# Patient Record
Sex: FEMALE | Race: WHITE | NOT HISPANIC OR LATINO | Employment: PART TIME | URBAN - METROPOLITAN AREA
[De-identification: names, ages, dates, MRNs, and addresses within clinical notes are randomized per-mention and may not be internally consistent; named-entity substitution may affect disease eponyms.]

---
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
## 2023-01-03 VITALS
TEMPERATURE: 98 F | HEART RATE: 101 BPM | WEIGHT: 224 LBS | RESPIRATION RATE: 19 BRPM | SYSTOLIC BLOOD PRESSURE: 152 MMHG | DIASTOLIC BLOOD PRESSURE: 88 MMHG | OXYGEN SATURATION: 99 % | BODY MASS INDEX: 37.27 KG/M2

## 2023-01-03 PROCEDURE — 99283 EMERGENCY DEPT VISIT LOW MDM: CPT | Mod: 25

## 2023-01-03 PROCEDURE — 93010 EKG 12-LEAD: ICD-10-PCS | Mod: ,,, | Performed by: INTERNAL MEDICINE

## 2023-01-03 PROCEDURE — 93010 ELECTROCARDIOGRAM REPORT: CPT | Mod: ,,, | Performed by: INTERNAL MEDICINE

## 2023-01-03 PROCEDURE — 93005 ELECTROCARDIOGRAM TRACING: CPT

## 2023-01-03 RX ORDER — ONDANSETRON 2 MG/ML
4 INJECTION INTRAMUSCULAR; INTRAVENOUS
Status: DISCONTINUED | OUTPATIENT
Start: 2023-01-03 | End: 2023-01-04 | Stop reason: HOSPADM

## 2023-01-04 ENCOUNTER — HOSPITAL ENCOUNTER (EMERGENCY)
Facility: HOSPITAL | Age: 18
Discharge: HOME | End: 2023-01-04
Payer: MEDICAID

## 2023-01-04 DIAGNOSIS — R00.2 PALPITATIONS: ICD-10-CM

## 2023-01-04 LAB
ALBUMIN SERPL BCP-MCNC: 4.4 G/DL (ref 3.5–5.2)
ALP SERPL-CCNC: 123 U/L (ref 55–135)
ALT SERPL W/O P-5'-P-CCNC: 16 U/L (ref 10–44)
AMPHET+METHAMPHET UR QL: NEGATIVE
ANION GAP SERPL CALC-SCNC: 11 MMOL/L (ref 8–16)
AST SERPL-CCNC: 16 U/L (ref 10–40)
B-HCG UR QL: NEGATIVE
BACTERIA #/AREA URNS HPF: ABNORMAL /HPF
BARBITURATES UR QL SCN>200 NG/ML: NEGATIVE
BASOPHILS # BLD AUTO: 0.06 K/UL (ref 0–0.2)
BASOPHILS NFR BLD: 0.5 % (ref 0–1.9)
BENZODIAZ UR QL SCN>200 NG/ML: NEGATIVE
BILIRUB SERPL-MCNC: 0.3 MG/DL (ref 0.1–1)
BILIRUB UR QL STRIP: NEGATIVE
BUN SERPL-MCNC: 10 MG/DL (ref 6–20)
BZE UR QL SCN: NEGATIVE
CALCIUM SERPL-MCNC: 9.6 MG/DL (ref 8.7–10.5)
CANNABINOIDS UR QL SCN: NEGATIVE
CHLORIDE SERPL-SCNC: 109 MMOL/L (ref 95–110)
CLARITY UR: ABNORMAL
CO2 SERPL-SCNC: 19 MMOL/L (ref 23–29)
COLOR UR: YELLOW
CREAT SERPL-MCNC: 0.8 MG/DL (ref 0.5–1.4)
CREAT UR-MCNC: 165.7 MG/DL (ref 15–325)
DIFFERENTIAL METHOD: ABNORMAL
EOSINOPHIL # BLD AUTO: 0.2 K/UL (ref 0–0.5)
EOSINOPHIL NFR BLD: 1.6 % (ref 0–8)
ERYTHROCYTE [DISTWIDTH] IN BLOOD BY AUTOMATED COUNT: 11.9 % (ref 11.5–14.5)
EST. GFR  (NO RACE VARIABLE): >60 ML/MIN/1.73 M^2
GLUCOSE SERPL-MCNC: 90 MG/DL (ref 70–110)
GLUCOSE UR QL STRIP: NEGATIVE
HCT VFR BLD AUTO: 42.8 % (ref 37–48.5)
HGB BLD-MCNC: 14.7 G/DL (ref 12–16)
HGB UR QL STRIP: ABNORMAL
HYALINE CASTS #/AREA URNS LPF: 0 /LPF
IMM GRANULOCYTES # BLD AUTO: 0.05 K/UL (ref 0–0.04)
IMM GRANULOCYTES NFR BLD AUTO: 0.4 % (ref 0–0.5)
KETONES UR QL STRIP: NEGATIVE
LEUKOCYTE ESTERASE UR QL STRIP: ABNORMAL
LIPASE SERPL-CCNC: 26 U/L (ref 4–60)
LYMPHOCYTES # BLD AUTO: 3.7 K/UL (ref 1–4.8)
LYMPHOCYTES NFR BLD: 29 % (ref 18–48)
MCH RBC QN AUTO: 30.9 PG (ref 27–31)
MCHC RBC AUTO-ENTMCNC: 34.3 G/DL (ref 32–36)
MCV RBC AUTO: 90 FL (ref 82–98)
METHADONE UR QL SCN>300 NG/ML: NEGATIVE
MICROSCOPIC COMMENT: ABNORMAL
MONOCYTES # BLD AUTO: 1.1 K/UL (ref 0.3–1)
MONOCYTES NFR BLD: 8.5 % (ref 4–15)
NEUTROPHILS # BLD AUTO: 7.6 K/UL (ref 1.8–7.7)
NEUTROPHILS NFR BLD: 60 % (ref 38–73)
NITRITE UR QL STRIP: NEGATIVE
NRBC BLD-RTO: 0 /100 WBC
OPIATES UR QL SCN: NEGATIVE
PCP UR QL SCN>25 NG/ML: NEGATIVE
PH UR STRIP: 6 [PH] (ref 5–8)
PLATELET # BLD AUTO: 370 K/UL (ref 150–450)
PMV BLD AUTO: 8.5 FL (ref 9.2–12.9)
POTASSIUM SERPL-SCNC: 3.3 MMOL/L (ref 3.5–5.1)
PROT SERPL-MCNC: 8 G/DL (ref 6–8.4)
PROT UR QL STRIP: ABNORMAL
RBC # BLD AUTO: 4.75 M/UL (ref 4–5.4)
RBC #/AREA URNS HPF: 6 /HPF (ref 0–4)
SODIUM SERPL-SCNC: 139 MMOL/L (ref 136–145)
SP GR UR STRIP: 1.02 (ref 1–1.03)
SQUAMOUS #/AREA URNS HPF: 31 /HPF
TOXICOLOGY INFORMATION: NORMAL
UNIDENT CRYS URNS QL MICRO: ABNORMAL
URN SPEC COLLECT METH UR: ABNORMAL
UROBILINOGEN UR STRIP-ACNC: ABNORMAL EU/DL
WBC # BLD AUTO: 12.65 K/UL (ref 3.9–12.7)
WBC #/AREA URNS HPF: 22 /HPF (ref 0–5)
WBC CLUMPS URNS QL MICRO: ABNORMAL

## 2023-01-04 PROCEDURE — 80053 COMPREHEN METABOLIC PANEL: CPT | Performed by: NURSE PRACTITIONER

## 2023-01-04 PROCEDURE — 87086 URINE CULTURE/COLONY COUNT: CPT | Performed by: NURSE PRACTITIONER

## 2023-01-04 PROCEDURE — 81025 URINE PREGNANCY TEST: CPT | Performed by: NURSE PRACTITIONER

## 2023-01-04 PROCEDURE — 85025 COMPLETE CBC W/AUTO DIFF WBC: CPT | Performed by: NURSE PRACTITIONER

## 2023-01-04 PROCEDURE — 81000 URINALYSIS NONAUTO W/SCOPE: CPT | Mod: 59 | Performed by: NURSE PRACTITIONER

## 2023-01-04 PROCEDURE — 83690 ASSAY OF LIPASE: CPT | Performed by: NURSE PRACTITIONER

## 2023-01-04 PROCEDURE — 80307 DRUG TEST PRSMV CHEM ANLYZR: CPT | Performed by: NURSE PRACTITIONER

## 2023-01-04 NOTE — ED NOTES
1/4/2023 1424 - spoke with patient regarding lab results and need for Macrobid 100mg BID for 7 days. Patient verbalized understanding and [facility] [place] [address]. Spoke with Cleo at [facility] and given Rx information.

## 2023-01-04 NOTE — ED NOTES
1/4/2023 0919 - Eloped status noted - chart and provider note reviewed. Dr. Cardona reviews chart and orders Macrobid 100 mg BID for 7 days be called in. Attempted to contact patient at number listed - no answer and unable to leave voicemail.

## 2023-01-04 NOTE — FIRST PROVIDER EVALUATION
Medical screening examination initiated.  I have conducted a focused provider triage encounter, findings are as follows:    Brief history of present illness:  reports unable to tolerate po. Also reports palpitations    Vitals:    01/03/23 2221   BP: (!) 152/88   BP Location: Right arm   Patient Position: Sitting   Pulse: 101   Resp: 19   Temp: 98.4 °F (36.9 °C)   TempSrc: Oral   SpO2: 99%   Weight: 101.6 kg (223 lb 15.8 oz)       Pertinent physical exam:  nad    Brief workup plan:  albs, ekg    Preliminary workup initiated; this workup will be continued and followed by the physician or advanced practice provider that is assigned to the patient when roomed.

## 2023-01-05 LAB — BACTERIA UR CULT: NORMAL

## 2023-02-02 ENCOUNTER — HOSPITAL ENCOUNTER (EMERGENCY)
Facility: HOSPITAL | Age: 18
Discharge: HOME | End: 2023-02-02
Attending: EMERGENCY MEDICINE
Payer: MEDICAID

## 2023-02-02 VITALS
SYSTOLIC BLOOD PRESSURE: 127 MMHG | OXYGEN SATURATION: 99 % | WEIGHT: 223.19 LBS | RESPIRATION RATE: 18 BRPM | BODY MASS INDEX: 37.15 KG/M2 | HEART RATE: 115 BPM | DIASTOLIC BLOOD PRESSURE: 81 MMHG | TEMPERATURE: 99 F

## 2023-02-02 DIAGNOSIS — U07.1 COVID-19: ICD-10-CM

## 2023-02-02 DIAGNOSIS — R05.1 ACUTE COUGH: Primary | ICD-10-CM

## 2023-02-02 DIAGNOSIS — R05.9 COUGH: ICD-10-CM

## 2023-02-02 LAB
INFLUENZA A, MOLECULAR: NEGATIVE
INFLUENZA B, MOLECULAR: NEGATIVE
SARS-COV-2 RDRP RESP QL NAA+PROBE: POSITIVE
SPECIMEN SOURCE: NORMAL

## 2023-02-02 PROCEDURE — U0002 COVID-19 LAB TEST NON-CDC: HCPCS | Performed by: REGISTERED NURSE

## 2023-02-02 PROCEDURE — 99284 EMERGENCY DEPT VISIT MOD MDM: CPT | Mod: 25

## 2023-02-02 PROCEDURE — 87502 INFLUENZA DNA AMP PROBE: CPT | Performed by: REGISTERED NURSE

## 2023-02-02 RX ORDER — ALBUTEROL SULFATE 90 UG/1
1-2 AEROSOL, METERED RESPIRATORY (INHALATION) EVERY 6 HOURS PRN
Qty: 6.7 G | Refills: 0 | Status: SHIPPED | OUTPATIENT
Start: 2023-02-02 | End: 2023-09-27

## 2023-02-02 RX ORDER — PROMETHAZINE HYDROCHLORIDE AND DEXTROMETHORPHAN HYDROBROMIDE 6.25; 15 MG/5ML; MG/5ML
5 SYRUP ORAL EVERY 6 HOURS PRN
Qty: 120 ML | Refills: 0 | Status: SHIPPED | OUTPATIENT
Start: 2023-02-02 | End: 2023-02-12

## 2023-02-02 NOTE — FIRST PROVIDER EVALUATION
Medical screening examination initiated.  I have conducted a focused provider triage encounter, findings are as follows:    Brief history of present illness:  Cough and congestion with nausea    Vitals:    02/02/23 1618   BP: 127/81   BP Location: Right arm   Patient Position: Sitting   Pulse: (!) 115   Resp: 18   Temp: 98.5 °F (36.9 °C)   TempSrc: Oral   SpO2: 99%   Weight: 101.2 kg (223 lb 3.5 oz)       Pertinent physical exam:  Tachycardic, no acute distress, patient alert and oriented    Brief workup plan:  Swabs and further evaluation    Preliminary workup initiated; this workup will be continued and followed by the physician or advanced practice provider that is assigned to the patient when roomed.

## 2023-02-02 NOTE — Clinical Note
"Chari"Nini Rod was seen and treated in our emergency department on 2/2/2023.     COVID-19 is present in our communities across the state. There is limited testing for COVID at this time, so not all patients can be tested. In this situation, your employee meets the following criteria:    Chari Rod has met the criteria for COVID-19 testing and has a POSITIVE result. She can return to work once they are asymptomatic for 24 hours without the use of fever reducing medications AND at least five days from the first positive result. A mask is recommended for 5 days post quarantine.     If you have any questions or concerns, or if I can be of further assistance, please do not hesitate to contact me.    Sincerely,             Darron Estrella NP"

## 2023-02-03 NOTE — ED PROVIDER NOTES
Encounter Date: 2/2/2023       History     Chief Complaint   Patient presents with    General Illness     Cough, congestion, N/V onset 3 days ago.      Patient complains cold congestion   With nausea and vomiting several days.    The history is provided by the patient.   General Illness   Nothing relieves the symptoms. Nothing aggravates the symptoms. Associated symptoms include nausea, vomiting, cough and URI. Pertinent negatives include no fever, no abdominal pain, no diarrhea, no sore throat, no shortness of breath, no wheezing and no rash.   Review of patient's allergies indicates:   Allergen Reactions    Shellfish derived Swelling     Past Medical History:   Diagnosis Date    ADD (attention deficit disorder)     Allergy     seasonal    Anxiety     Eczema      Past Surgical History:   Procedure Laterality Date    ARTHROSCOPIC DEBRIDEMENT OF WRIST Right 4/26/2021    Procedure: ARTHROSCOPY, WRIST, WITH DEBRIDEMENT, right;  Surgeon: Vale Rich MD;  Location: [facility];  Service: Orthopedics;  Laterality: Right;  [facility]    ARTHROSCOPY OF KNEE Right 6/27/2018    Procedure: ARTHROSCOPY, KNEE;  Surgeon: Adrian Shannon MD;  Location: [facility] OR [address] [place] [address];  Service: Orthopedics;  Laterality: Right;    COLONOSCOPY N/A 1/22/2019    Procedure: COLONOSCOPY;  Surgeon: Miko Parmar MD;  Location: [facility];  Service: Endoscopy;  Laterality: N/A;    ESOPHAGOGASTRODUODENOSCOPY N/A 1/22/2019    Procedure: ESOPHAGOGASTRODUODENOSCOPY (EGD);  Surgeon: Miko Parmar MD;  Location: [facility];  Service: Endoscopy;  Laterality: N/A;    FEMUR OSTEOTOMY Right 2/27/2020    Procedure: OSTEOTOMY, FEMUR (RIGHT) - correction of genu valgum.  Orthopediatrics distal femur plate.  MTF Thorpe wedges.;  Surgeon: Adrian Shannon MD;  Location: [facility] OR [address] [place] [address];  Service: Orthopedics;  Laterality: Right;  [facility] notified 2/21 MAL    OSTEOTOMY AND ULNAR SHORTENING Right 9/17/2021    Procedure: OSTEOTOMY, ULNA, FOR SHORTENING,RIGHT;   Surgeon: Vale Rich MD;  Location: [facility] OR;  Service: Orthopedics;  Laterality: Right;  MAC/REGIONAL     REPAIR OF MENISCUS OF KNEE Right 6/27/2018    Procedure: REPAIR, MENISCUS, KNEE-- medial;  Surgeon: Adrian Shannon MD;  Location: [facility] OR [facility] FLR;  Service: Orthopedics;  Laterality: Right;    TONSILLECTOMY, ADENOIDECTOMY      TYMPANOSTOMY TUBE PLACEMENT       Family History   Problem Relation Age of Onset    ADD / ADHD Mother     Asthma Mother     Diabetes Mother     Eczema Mother     Thyroid disease Maternal Grandmother     Diabetes Maternal Grandfather     No Known Problems Father     No Known Problems Sister     No Known Problems Brother     No Known Problems Maternal Aunt     No Known Problems Maternal Uncle     No Known Problems Paternal Aunt     No Known Problems Paternal Uncle     No Known Problems Paternal Grandmother     No Known Problems Paternal Grandfather     Alcohol abuse Neg Hx     Allergies Neg Hx     Autism spectrum disorder Neg Hx     Behavior problems Neg Hx     Birth defects Neg Hx     Cancer Neg Hx     Chromosomal disorder Neg Hx     Cleft lip Neg Hx     Congenital heart disease Neg Hx     Depression Neg Hx     Early death Neg Hx     Hearing loss Neg Hx     Heart disease Neg Hx     Hyperlipidemia Neg Hx     Hypertension Neg Hx     Kidney disease Neg Hx     Learning disabilities Neg Hx     Mental illness Neg Hx     Migraines Neg Hx     Neurodegenerative disease Neg Hx     Obesity Neg Hx     Seizures Neg Hx     SIDS Neg Hx     Other Neg Hx      Social History     Tobacco Use    Smoking status: Never    Smokeless tobacco: Never   Substance Use Topics    Alcohol use: No    Drug use: No     Review of Systems   Constitutional:  Negative for fever.   HENT:  Negative for sore throat.    Respiratory:  Positive for cough. Negative for shortness of breath and wheezing.    Cardiovascular:  Negative for chest pain.   Gastrointestinal:  Positive for nausea and vomiting. Negative for  abdominal pain and diarrhea.   Genitourinary:  Negative for dysuria.   Musculoskeletal:  Negative for back pain.   Skin:  Negative for rash.   Neurological:  Negative for weakness.   Hematological:  Does not bruise/bleed easily.     Physical Exam     Initial Vitals [02/02/23 1618]   BP Pulse Resp Temp SpO2   127/81 (!) 115 18 98.5 °F (36.9 °C) 99 %      MAP       --         Physical Exam    Nursing note and vitals reviewed.  Constitutional: She appears well-developed and well-nourished. She is not diaphoretic. She is active.  Non-toxic appearance. No distress.   HENT:   Head: Normocephalic and atraumatic.   Eyes: Conjunctivae are normal. Right eye exhibits no discharge. Left eye exhibits no discharge. No scleral icterus.   Neck:   Normal range of motion.  Cardiovascular:  Normal rate, regular rhythm and intact distal pulses.           No murmur heard.  Pulmonary/Chest: No respiratory distress. She has no wheezes. She has rhonchi.   Abdominal: She exhibits no distension.   Musculoskeletal:         General: No tenderness. Normal range of motion.      Cervical back: Normal range of motion.     Neurological: She is alert and oriented to person, place, and time. No cranial nerve deficit. GCS score is 15. GCS eye subscore is 4. GCS verbal subscore is 5. GCS motor subscore is 6.   Skin: Skin is warm and dry. Capillary refill takes less than 2 seconds. No rash noted.   Psychiatric: She has a normal mood and affect. Her behavior is normal. Judgment and thought content normal.       ED Course   Procedures  Labs Reviewed   SARS-COV-2 RNA AMPLIFICATION, QUAL - Abnormal; Notable for the following components:       Result Value    SARS-CoV-2 RNA, Amplification, Qual Positive (*)     All other components within normal limits   INFLUENZA A & B BY MOLECULAR          Imaging Results              X-Ray Chest 1 View (Final result)  Result time 02/02/23 19:05:39      Final result by Blake Garduno MD (02/02/23 19:05:39)                    Impression:      No acute abnormality.      Electronically signed by: Shaan Lozano  Date:    02/02/2023  Time:    19:05               Narrative:    EXAMINATION:  XR CHEST 1 VIEW    CLINICAL HISTORY:  Cough, unspecified    TECHNIQUE:  Single frontal view of the chest was performed.    COMPARISON:  None    FINDINGS:  The lungs are clear, with normal appearance of pulmonary vasculature and no pleural effusion or pneumothorax.    The cardiac silhouette is normal in size. The hilar and mediastinal contours are unremarkable.    Bones are intact.                                       Medications - No data to display                           Clinical Impression:   Final diagnoses:  [R05.9] Cough  [R05.1] Acute cough (Primary)  [U07.1] COVID-19        ED Disposition Condition    Discharge Stable          ED Prescriptions       Medication Sig Dispense Start Date End Date Auth. Provider    albuterol (PROVENTIL/VENTOLIN HFA) 90 mcg/actuation inhaler Inhale 1-2 puffs into the lungs every 6 (six) hours as needed for Wheezing. Rescue 6.7 g 2/2/2023 2/2/2024 Darron Estrella NP    promethazine-dextromethorphan (PROMETHAZINE-DM) 6.25-15 mg/5 mL Syrp Take 5 mLs by mouth every 6 (six) hours as needed. 120 mL 2/2/2023 2/12/2023 Darron Estrella NP          Follow-up Information       Follow up With Specialties Details Why Contact Info    pcp                 Darron Estrella NP  02/02/23 1938

## 2023-02-27 ENCOUNTER — PATIENT MESSAGE (OUTPATIENT)
Dept: PSYCHIATRY | Facility: CLINIC | Age: 18
End: 2023-02-27
Payer: MEDICAID

## 2023-06-04 ENCOUNTER — HOSPITAL ENCOUNTER (EMERGENCY)
Facility: HOSPITAL | Age: 18
Discharge: HOME | End: 2023-06-04
Attending: SURGERY
Payer: MEDICAID

## 2023-06-04 VITALS
TEMPERATURE: 99 F | RESPIRATION RATE: 18 BRPM | HEART RATE: 105 BPM | DIASTOLIC BLOOD PRESSURE: 74 MMHG | OXYGEN SATURATION: 100 % | BODY MASS INDEX: 35.09 KG/M2 | WEIGHT: 210.88 LBS | SYSTOLIC BLOOD PRESSURE: 115 MMHG

## 2023-06-04 DIAGNOSIS — J06.9 VIRAL URI WITH COUGH: Primary | ICD-10-CM

## 2023-06-04 DIAGNOSIS — J01.90 ACUTE SINUSITIS, RECURRENCE NOT SPECIFIED, UNSPECIFIED LOCATION: ICD-10-CM

## 2023-06-04 LAB
INFLUENZA A, MOLECULAR: NEGATIVE
INFLUENZA B, MOLECULAR: NEGATIVE
SARS-COV-2 RDRP RESP QL NAA+PROBE: NEGATIVE
SPECIMEN SOURCE: NORMAL

## 2023-06-04 PROCEDURE — U0002 COVID-19 LAB TEST NON-CDC: HCPCS | Performed by: NURSE PRACTITIONER

## 2023-06-04 PROCEDURE — 87502 INFLUENZA DNA AMP PROBE: CPT | Performed by: NURSE PRACTITIONER

## 2023-06-04 PROCEDURE — 99283 EMERGENCY DEPT VISIT LOW MDM: CPT

## 2023-06-04 RX ORDER — CETIRIZINE HYDROCHLORIDE 10 MG/1
10 TABLET ORAL DAILY
Qty: 30 TABLET | Refills: 0 | Status: SHIPPED | OUTPATIENT
Start: 2023-06-04 | End: 2023-09-27

## 2023-06-04 RX ORDER — FLUTICASONE PROPIONATE 50 MCG
1 SPRAY, SUSPENSION (ML) NASAL DAILY PRN
Qty: 15 G | Refills: 0 | Status: SHIPPED | OUTPATIENT
Start: 2023-06-04 | End: 2023-06-11

## 2023-06-04 NOTE — Clinical Note
"Chari"Nini Rod was seen and treated in our emergency department on 6/4/2023.  She may return to work on 06/05/2023.  Please excuse 06/03/23     If you have any questions or concerns, please don't hesitate to call.      aMgdaleno Pena MD"

## 2023-06-04 NOTE — Clinical Note
"Chari"Nini Rod was seen and treated in our emergency department on 6/4/2023.  She may return to work on 06/05/2023.  Please excuse 06/03/23     If you have any questions or concerns, please don't hesitate to call.      Magdaleno Pena MD"

## 2023-06-04 NOTE — ED PROVIDER NOTES
Encounter Date: 6/4/2023       History     Chief Complaint   Patient presents with    General Illness     Patient to ER CC of headache, runny nose, congestion for a few days states she was exposed to COVID     Chari Esthela Rod is a 17 y.o. female with PMH of ADD and anxiety who presents to the ED for evaluation of URI symptoms.  Generalized headache and nasal congestion for the past several days. + NP, dry cough. No cp or sob.   + recent exposure to covid-19 and would like to be tested.     The history is provided by the patient.   Review of patient's allergies indicates:   Allergen Reactions    Shellfish derived Swelling     Past Medical History:   Diagnosis Date    ADD (attention deficit disorder)     Allergy     seasonal    Anxiety     Eczema      Past Surgical History:   Procedure Laterality Date    ARTHROSCOPIC DEBRIDEMENT OF WRIST Right 4/26/2021    Procedure: ARTHROSCOPY, WRIST, WITH DEBRIDEMENT, right;  Surgeon: Vale Rich MD;  Location: [facility];  Service: Orthopedics;  Laterality: Right;  Regional/[facility]    ARTHROSCOPY OF KNEE Right 6/27/2018    Procedure: ARTHROSCOPY, KNEE;  Surgeon: Adrian Shannon MD;  Location: [facility] 2ND FLR;  Service: Orthopedics;  Laterality: Right;    COLONOSCOPY N/A 1/22/2019    Procedure: COLONOSCOPY;  Surgeon: Miko Parmar MD;  Location: [facility];  Service: Endoscopy;  Laterality: N/A;    ESOPHAGOGASTRODUODENOSCOPY N/A 1/22/2019    Procedure: ESOPHAGOGASTRODUODENOSCOPY (EGD);  Surgeon: Miko Parmar MD;  Location: [facility];  Service: Endoscopy;  Laterality: N/A;    FEMUR OSTEOTOMY Right 2/27/2020    Procedure: OSTEOTOMY, FEMUR (RIGHT) - correction of genu valgum.  Orthopediatrics distal femur plate.  MTF Thorpe wedges.;  Surgeon: Adrian Shannon MD;  [facility]: [facility] OR 1ST FLR;  Service: Orthopedics;  Laterality: Right;  [facility] notified 2/21 MAL    OSTEOTOMY AND ULNAR SHORTENING Right 9/17/2021    Procedure: OSTEOTOMY, ULNA, FOR SHORTENING,RIGHT;   Surgeon: Vale Rich MD;  Location: [facility] OR;  Service: Orthopedics;  Laterality: Right;  MAC/REGIONAL     REPAIR OF MENISCUS OF KNEE Right 6/27/2018    Procedure: REPAIR, MENISCUS, KNEE-- medial;  Surgeon: Adrian Shannon MD;  Location: [facility] OR [facility] FLR;  Service: Orthopedics;  Laterality: Right;    TONSILLECTOMY, ADENOIDECTOMY      TYMPANOSTOMY TUBE PLACEMENT       Family History   Problem Relation Age of Onset    ADD / ADHD Mother     Asthma Mother     Diabetes Mother     Eczema Mother     Thyroid disease Maternal Grandmother     Diabetes Maternal Grandfather     No Known Problems Father     No Known Problems Sister     No Known Problems Brother     No Known Problems Maternal Aunt     No Known Problems Maternal Uncle     No Known Problems Paternal Aunt     No Known Problems Paternal Uncle     No Known Problems Paternal Grandmother     No Known Problems Paternal Grandfather     Alcohol abuse Neg Hx     Allergies Neg Hx     Autism spectrum disorder Neg Hx     Behavior problems Neg Hx     Birth defects Neg Hx     Cancer Neg Hx     Chromosomal disorder Neg Hx     Cleft lip Neg Hx     Congenital heart disease Neg Hx     Depression Neg Hx     Early death Neg Hx     Hearing loss Neg Hx     Heart disease Neg Hx     Hyperlipidemia Neg Hx     Hypertension Neg Hx     Kidney disease Neg Hx     Learning disabilities Neg Hx     Mental illness Neg Hx     Migraines Neg Hx     Neurodegenerative disease Neg Hx     Obesity Neg Hx     Seizures Neg Hx     SIDS Neg Hx     Other Neg Hx      Social History     Tobacco Use    Smoking status: Never    Smokeless tobacco: Never   Substance Use Topics    Alcohol use: No    Drug use: No     Review of Systems   Constitutional:  Negative for fever.   HENT:  Positive for congestion. Negative for sore throat.    Respiratory:  Negative for chest tightness and shortness of breath.    Cardiovascular:  Negative for chest pain.   Gastrointestinal:  Negative for abdominal pain and nausea.    Genitourinary:  Negative for dysuria, frequency and urgency.   Musculoskeletal:  Negative for back pain.   Skin:  Negative for rash.   Neurological:  Positive for headaches. Negative for dizziness, weakness, light-headedness and numbness.   Hematological:  Does not bruise/bleed easily.     Physical Exam     Initial Vitals [06/04/23 1125]   BP Pulse Resp Temp SpO2   118/72 (!) 113 18 98.8 °F (37.1 °C) 98 %      MAP       --         Physical Exam    Nursing note and vitals reviewed.  Constitutional: She appears well-developed and well-nourished.   HENT:   Head: Normocephalic and atraumatic.   Right Ear: Tympanic membrane, external ear and ear canal normal. Tympanic membrane is not erythematous. No middle ear effusion.   Left Ear: Tympanic membrane, external ear and ear canal normal. Tympanic membrane is not erythematous.  No middle ear effusion.   Nose: Mucosal edema and rhinorrhea present.   Mouth/Throat: Uvula is midline, oropharynx is clear and moist and mucous membranes are normal. Mucous membranes are not pale and not dry.   Eyes: Conjunctivae and EOM are normal. Pupils are equal, round, and reactive to light.   Neck: Neck supple.   Normal range of motion.  Cardiovascular:  Normal rate, regular rhythm, normal heart sounds and intact distal pulses.           Pulmonary/Chest: Effort normal and breath sounds normal. She has no decreased breath sounds. She has no wheezes. She has no rhonchi. She has no rales.   Abdominal: Abdomen is soft. Bowel sounds are normal. There is no abdominal tenderness.   Musculoskeletal:         General: Normal range of motion.      Cervical back: Normal range of motion and neck supple.     Neurological: She is alert and oriented to person, place, and time. She has normal strength. She displays normal reflexes. No cranial nerve deficit or sensory deficit.   Skin: Skin is warm and dry. Capillary refill takes less than 2 seconds. No rash noted.   Psychiatric: She has a normal mood and  affect. Her behavior is normal. Judgment and thought content normal.       ED Course   Procedures  Labs Reviewed   INFLUENZA A & B BY MOLECULAR   SARS-COV-2 RNA AMPLIFICATION, QUAL          Imaging Results    None          Medications - No data to display  Medical Decision Making:   Differential Diagnosis:   Covid, Influenza, viral uri, sinusitis   Clinical Tests:   Lab Tests: Ordered and Reviewed  ED Management:  Evaluation of a 17-year-old female with nasal congestion and generalized headache with concern for COVID-19 after recent exposure.  COVID and flu negative  Patient discharged home with symptomatic treatment for sinusitis. Patient/caregiver voices understanding and feels comfortable with discharge plan.      The patient acknowledges that close follow up with medical provider is required. Instructed to follow up with PCP within 2 days. Patient was given specific return precautions. The patient agrees to comply with all instruction and directions given in the ER.                          Clinical Impression:   Final diagnoses:  [J06.9] Viral URI with cough (Primary)  [J01.90] Acute sinusitis, recurrence not specified, unspecified location        ED Disposition Condition    Discharge Stable          ED Prescriptions       Medication Sig Dispense Start Date End Date Auth. Provider    fluticasone propionate (FLONASE) 50 mcg/actuation nasal spray 1 spray (50 mcg total) by Each Nostril route daily as needed for Rhinitis or Allergies. 15 g 6/4/2023 6/11/2023 Dipika Cuevas NP    cetirizine (ZYRTEC) 10 MG tablet Take 1 tablet (10 mg total) by mouth once daily. 30 tablet 6/4/2023 6/3/2024 Diipka Cuevas NP          Follow-up Information       Follow up With Specialties Details Why Contact Info    Analy Lantigua MD Internal Medicine Schedule an appointment as soon as possible for a visit in 2 days  [address] [place] [id number]  [phone]               Dipika Cuevas NP  06/04/23 4838

## 2023-07-05 ENCOUNTER — TELEPHONE (OUTPATIENT)
Dept: OBSTETRICS AND GYNECOLOGY | Facility: CLINIC | Age: 18
End: 2023-07-05
Payer: MEDICAID

## 2023-07-05 NOTE — TELEPHONE ENCOUNTER
----- Message from Chela Jordan sent at 7/3/2023  1:10 PM CDT -----  Contact: JOSE LUIS Lo is calling to schedule appointment for birth control removal. Please call patient back at  [phone]        Thanks

## 2023-07-11 ENCOUNTER — PROCEDURE VISIT (OUTPATIENT)
Dept: OBSTETRICS AND GYNECOLOGY | Facility: CLINIC | Age: 18
End: 2023-07-11
Payer: MEDICAID

## 2023-07-11 VITALS
DIASTOLIC BLOOD PRESSURE: 86 MMHG | SYSTOLIC BLOOD PRESSURE: 122 MMHG | BODY MASS INDEX: 35.01 KG/M2 | WEIGHT: 210.13 LBS | HEIGHT: 65 IN

## 2023-07-11 DIAGNOSIS — Z30.011 ENCOUNTER FOR INITIAL PRESCRIPTION OF CONTRACEPTIVE PILLS: ICD-10-CM

## 2023-07-11 DIAGNOSIS — Z30.46 ENCOUNTER FOR NEXPLANON REMOVAL: Primary | ICD-10-CM

## 2023-07-11 PROCEDURE — 11982 REMOVAL OF NEXPLANON DEVICE: ICD-10-PCS | Mod: S$PBB,,, | Performed by: NURSE PRACTITIONER

## 2023-07-11 PROCEDURE — 11982 REMOVE DRUG IMPLANT DEVICE: CPT | Mod: S$PBB,,, | Performed by: NURSE PRACTITIONER

## 2023-07-11 PROCEDURE — 11982 REMOVE DRUG IMPLANT DEVICE: CPT | Mod: PBBFAC,PO | Performed by: NURSE PRACTITIONER

## 2023-07-11 RX ORDER — EPINEPHRINE 0.3 MG/.3ML
INJECTION SUBCUTANEOUS
COMMUNITY
End: 2023-09-27

## 2023-07-11 RX ORDER — NORETHINDRONE ACETATE AND ETHINYL ESTRADIOL 1MG-20(21)
1 KIT ORAL DAILY
Qty: 28 TABLET | Refills: 5 | Status: SHIPPED | OUTPATIENT
Start: 2023-07-11 | End: 2023-09-27

## 2023-07-11 NOTE — PROCEDURES
Removal of Nexplanon Device    Date/Time: 7/11/2023 2:00 PM  Performed by: Irma Grewal NP  Authorized by: Irma Grewal NP     Consent obtained:  Written  Consent given by:  Patient  Procedure risks and benefits discussed: yes    Patient questions answered: yes    Patient agrees, verbalizes understanding, and wants to proceed: yes    Educational handouts given: no    Instructions and paperwork completed: yes    Implant grasped by: hemostat  Removal due to infection and inflammatory reaction: no    Other reason for removal:  Arm pain  Removal due to mechanical complications of Nexplanon: yes    Removed with no complications: yes      Pt tolerated wellno  Arm: left  Palpation confirms location: yes  Small stab incision was made in arm: yes  Upon removal device was intact: yes  Site was close with steri-strips and pressure bandage applied: yes  Pre-procedure timeout performed: yes  Prepped with:  chlorhexidine gluconate  Local anesthetic:  Lidocaine with epinephrine   The site was cleaned  and prepped in a sterile fashion: yes    G0 present for nexplanon removal with her friend Zaria; reports she has been experiencing arm pain since insertion; wants to switch to pills.  Has only used depo and nexplanon in the past    Discussed risks of DVT development with birth control use.  Pt denies history of blood clots, DVT, cardiac issues, HTN, CVA, gallbladder disease, liver disease, smoking, migraines with an aura, or adrenal disease.  Pt denies family hx of DVT.      Rx sent for junel fe  Instructed pt to start pills on today with one daily.  Set a reminder to prevent forgetting.  May experience nausea, HAs, or irregular bleeding in the first three months.    Safe sex practices discussed.    F/u in 3-4 weeks for arm check and annual exam; pt desires appt in [place]; will schedule on [employer].

## 2023-08-07 ENCOUNTER — HOSPITAL ENCOUNTER (EMERGENCY)
Facility: HOSPITAL | Age: 18
Discharge: HOME | End: 2023-08-07
Attending: SURGERY
Payer: MEDICAID

## 2023-08-07 VITALS
DIASTOLIC BLOOD PRESSURE: 84 MMHG | WEIGHT: 206.44 LBS | TEMPERATURE: 98 F | RESPIRATION RATE: 18 BRPM | SYSTOLIC BLOOD PRESSURE: 141 MMHG | BODY MASS INDEX: 34.36 KG/M2 | HEART RATE: 88 BPM | OXYGEN SATURATION: 97 %

## 2023-08-07 DIAGNOSIS — R11.2 NAUSEA AND VOMITING, UNSPECIFIED VOMITING TYPE: ICD-10-CM

## 2023-08-07 DIAGNOSIS — N30.00 ACUTE CYSTITIS WITHOUT HEMATURIA: Primary | ICD-10-CM

## 2023-08-07 LAB
B-HCG UR QL: NEGATIVE
BACTERIA #/AREA URNS HPF: ABNORMAL /HPF
BILIRUB UR QL STRIP: NEGATIVE
CLARITY UR: ABNORMAL
COLOR UR: YELLOW
GLUCOSE UR QL STRIP: NEGATIVE
HGB UR QL STRIP: ABNORMAL
KETONES UR QL STRIP: NEGATIVE
LEUKOCYTE ESTERASE UR QL STRIP: ABNORMAL
MICROSCOPIC COMMENT: ABNORMAL
NITRITE UR QL STRIP: NEGATIVE
NON-SQ EPI CELLS #/AREA URNS HPF: 50 /HPF
PH UR STRIP: 7 [PH] (ref 5–8)
PROT UR QL STRIP: NEGATIVE
RBC #/AREA URNS HPF: 0 /HPF (ref 0–4)
SP GR UR STRIP: 1.02 (ref 1–1.03)
URN SPEC COLLECT METH UR: ABNORMAL
UROBILINOGEN UR STRIP-ACNC: 1 EU/DL
WBC #/AREA URNS HPF: 30 /HPF (ref 0–5)

## 2023-08-07 PROCEDURE — 81025 URINE PREGNANCY TEST: CPT

## 2023-08-07 PROCEDURE — 87086 URINE CULTURE/COLONY COUNT: CPT

## 2023-08-07 PROCEDURE — 81000 URINALYSIS NONAUTO W/SCOPE: CPT

## 2023-08-07 PROCEDURE — 25000003 PHARM REV CODE 250

## 2023-08-07 PROCEDURE — 99284 EMERGENCY DEPT VISIT MOD MDM: CPT

## 2023-08-07 RX ORDER — ONDANSETRON 4 MG/1
4 TABLET, ORALLY DISINTEGRATING ORAL
Status: COMPLETED | OUTPATIENT
Start: 2023-08-07 | End: 2023-08-07

## 2023-08-07 RX ORDER — NITROFURANTOIN 25; 75 MG/1; MG/1
100 CAPSULE ORAL 2 TIMES DAILY
Qty: 10 CAPSULE | Refills: 0 | Status: SHIPPED | OUTPATIENT
Start: 2023-08-07 | End: 2023-08-12

## 2023-08-07 RX ORDER — PROMETHAZINE HYDROCHLORIDE 25 MG/ML
25 INJECTION, SOLUTION INTRAMUSCULAR; INTRAVENOUS
Status: DISCONTINUED | OUTPATIENT
Start: 2023-08-07 | End: 2023-08-07

## 2023-08-07 RX ORDER — ONDANSETRON 4 MG/1
4 TABLET, ORALLY DISINTEGRATING ORAL EVERY 8 HOURS PRN
Qty: 15 TABLET | Refills: 0 | Status: SHIPPED | OUTPATIENT
Start: 2023-08-07 | End: 2023-09-27

## 2023-08-07 RX ADMIN — ONDANSETRON 4 MG: 4 TABLET, ORALLY DISINTEGRATING ORAL at 11:08

## 2023-08-07 NOTE — ED PROVIDER NOTES
Encounter Date: 8/7/2023       History     Chief Complaint   Patient presents with    Emesis     Patient to ER CC of vomiting for the past 3 days states she can not hold anything down, no other symptoms      This note is dictated on M*Modal word recognition program.  There are word recognition mistakes and grammatical errors that are occasionally missed on review.     Chari Rod is a 17 y.o. female presents to ER today with complaints of nausea and vomiting since this past Thursday.  Patient reports she has been very sick and has not been able to hold much fluid or food down.      The history is provided by the patient.     Review of patient's allergies indicates:   Allergen Reactions    Shellfish derived Swelling     Past Medical History:   Diagnosis Date    ADD (attention deficit disorder)     Allergy     seasonal    Anxiety     Eczema      Past Surgical History:   Procedure Laterality Date    ARTHROSCOPIC DEBRIDEMENT OF WRIST Right 4/26/2021    Procedure: ARTHROSCOPY, WRIST, WITH DEBRIDEMENT, right;  Surgeon: Vale Rich MD;  Location: [facility];  Service: Orthopedics;  Laterality: Right;  [facility]    ARTHROSCOPY OF KNEE Right 6/27/2018    Procedure: ARTHROSCOPY, KNEE;  Surgeon: Adrian Shannon MD;  Location: [facility] OR 2ND FLR;  Service: Orthopedics;  Laterality: Right;    COLONOSCOPY N/A 1/22/2019    Procedure: COLONOSCOPY;  Surgeon: Miko Parmar MD;  Location: [facility];  Service: Endoscopy;  Laterality: N/A;    ESOPHAGOGASTRODUODENOSCOPY N/A 1/22/2019    Procedure: ESOPHAGOGASTRODUODENOSCOPY (EGD);  Surgeon: Miko Parmar MD;  Location: [facility];  Service: Endoscopy;  Laterality: N/A;    FEMUR OSTEOTOMY Right 2/27/2020    Procedure: OSTEOTOMY, FEMUR (RIGHT) - correction of genu valgum.  Orthopediatrics distal femur plate.  MTF Thorpe wedges.;  Surgeon: Adrian Shannon MD;  Location: [facility] OR 1ST FLR;  Service: Orthopedics;  Laterality: Right;  [facility] notified 2/21 MAL     OSTEOTOMY AND ULNAR SHORTENING Right 9/17/2021    Procedure: OSTEOTOMY, ULNA, FOR SHORTENING,RIGHT;  Surgeon: Vale Rich MD;  Location: [facility] OR;  Service: Orthopedics;  Laterality: Right;  MAC/REGIONAL     REPAIR OF MENISCUS OF KNEE Right 6/27/2018    Procedure: REPAIR, MENISCUS, KNEE-- medial;  Surgeon: Adrian Shannon MD;  Location: [address];  Service: Orthopedics;  Laterality: Right;    TONSILLECTOMY, ADENOIDECTOMY      TYMPANOSTOMY TUBE PLACEMENT       Family History   Problem Relation Age of Onset    ADD / ADHD Mother     Asthma Mother     Diabetes Mother     Eczema Mother     Thyroid disease Maternal Grandmother     Diabetes Maternal Grandfather     No Known Problems Father     No Known Problems Sister     No Known Problems Brother     No Known Problems Maternal Aunt     No Known Problems Maternal Uncle     No Known Problems Paternal Aunt     No Known Problems Paternal Uncle     No Known Problems Paternal Grandmother     No Known Problems Paternal Grandfather     Alcohol abuse Neg Hx     Allergies Neg Hx     Autism spectrum disorder Neg Hx     Behavior problems Neg Hx     Birth defects Neg Hx     Cancer Neg Hx     Chromosomal disorder Neg Hx     Cleft lip Neg Hx     Congenital heart disease Neg Hx     Depression Neg Hx     Early death Neg Hx     Hearing loss Neg Hx     Heart disease Neg Hx     Hyperlipidemia Neg Hx     Hypertension Neg Hx     Kidney disease Neg Hx     Learning disabilities Neg Hx     Mental illness Neg Hx     Migraines Neg Hx     Neurodegenerative disease Neg Hx     Obesity Neg Hx     Seizures Neg Hx     SIDS Neg Hx     Other Neg Hx      Social History     Tobacco Use    Smoking status: Never    Smokeless tobacco: Never   Substance Use Topics    Alcohol use: No    Drug use: No     Review of Systems   Constitutional: Negative.    HENT: Negative.     Eyes: Negative.    Respiratory: Negative.     Gastrointestinal:  Positive for abdominal pain (patient reports lower abdominal  cramping for the past few days.), nausea and vomiting.   Endocrine: Negative.    Genitourinary: Negative.    Musculoskeletal: Negative.    Skin: Negative.    Allergic/Immunologic: Negative.    Neurological: Negative.    Hematological: Negative.    Psychiatric/Behavioral: Negative.         Physical Exam     Initial Vitals [08/07/23 1133]   BP Pulse Resp Temp SpO2   130/80 94 18 98.4 °F (36.9 °C) 98 %      MAP       --         Physical Exam    Constitutional: She appears well-developed and well-nourished. She is not diaphoretic. No distress.   HENT:   Right Ear: External ear normal.   Left Ear: External ear normal.   Eyes: Pupils are equal, round, and reactive to light. Right eye exhibits no discharge. Left eye exhibits no discharge.   Neck: Neck supple.   Normal range of motion.  Cardiovascular:  Normal rate.     Exam reveals no friction rub.       No murmur heard.  Pulmonary/Chest: Breath sounds normal. No respiratory distress. She has no wheezes.   Abdominal: Abdomen is soft. She exhibits no distension. There is no abdominal tenderness. There is no rebound and no guarding.   Musculoskeletal:         General: No tenderness or edema.      Cervical back: Normal range of motion and neck supple.     Neurological: She is oriented to person, place, and time. GCS score is 15. GCS eye subscore is 4. GCS verbal subscore is 5. GCS motor subscore is 6.   Skin: Capillary refill takes less than 2 seconds. No rash and no abscess noted. No erythema. No pallor.   Psychiatric: She has a normal mood and affect.         ED Course   Procedures  Labs Reviewed   URINALYSIS, REFLEX TO URINE CULTURE - Abnormal; Notable for the following components:       Result Value    Appearance, UA Hazy (*)     Occult Blood UA Trace (*)     Leukocytes, UA 2+ (*)     All other components within normal limits    Narrative:     Specimen Source->Urine   URINALYSIS MICROSCOPIC - Abnormal; Notable for the following components:    WBC, UA 30 (*)     Bacteria  Moderate (*)     Non-Squam Epith 50 (*)     All other components within normal limits    Narrative:     Specimen Source->Urine   CULTURE, URINE   PREGNANCY TEST, URINE RAPID    Narrative:     Specimen Source->Urine          Imaging Results    None          Medications   ondansetron disintegrating tablet 4 mg (4 mg Oral Given 8/7/23 1145)     Medical Decision Making:   Differential Diagnosis:   Viral gastroenteritis, urinary tract infection, food poisoning  Clinical Tests:   Lab Tests: Ordered and Reviewed  ED Management:  Urinalysis has hazy appearance, 2+ leukocytes, 30 white blood cells, moderate bacteria.    Will treat with Macrobid antibiotic.    Will treat patient's nausea vomiting with Zofran.    Patient's hemodynamically stable during this ER visit.    Patient stable at time of discharge in no acute distress.  No life-threatening illnesses were found during ER visit today.  Patient was instructed to follow-up with PCP or other recommended specialist within the next 48-72 hours.  Patient was instructed to return to ER immediately for any worsening or concerning symptoms.  All discharge instructions discussed with patient, and patient agrees to comply with discharge instructions given today.              ED Course as of 08/07/23 1312   Mon Aug 07, 2023   1310 Patient is currently drinking a Dr. Pepper soda after Zofran administration.  Patient is keeping down Dr. Pepper without difficulty at this current time. [RG]      ED Course User Index  [RG] Magdaleno Quinones, NP                 Clinical Impression:   Final diagnoses:  [N30.00] Acute cystitis without hematuria (Primary)  [R11.2] Nausea and vomiting, unspecified vomiting type        ED Disposition Condition    Discharge Stable          ED Prescriptions       Medication Sig Dispense Start Date End Date Auth. Provider    ondansetron (ZOFRAN-ODT) 4 MG TbDL Take 1 tablet (4 mg total) by mouth every 8 (eight) hours as needed (nausea/vomiting). 15 tablet 8/7/2023 --  Magdaleno Quinones, NP    nitrofurantoin, macrocrystal-monohydrate, (MACROBID) 100 MG capsule Take 1 capsule (100 mg total) by mouth 2 (two) times daily. for 5 days 10 capsule 8/7/2023 8/12/2023 Magdaleno Quinones, NP          Follow-up Information    None          Magdaleno Quinones, JUDI  08/07/23 0332

## 2023-08-08 LAB
BACTERIA UR CULT: NORMAL
BACTERIA UR CULT: NORMAL

## 2023-08-30 ENCOUNTER — HOSPITAL ENCOUNTER (EMERGENCY)
Facility: HOSPITAL | Age: 18
Discharge: HOME | End: 2023-08-30
Attending: STUDENT IN AN ORGANIZED HEALTH CARE EDUCATION/TRAINING PROGRAM
Payer: MEDICAID

## 2023-08-30 VITALS
WEIGHT: 208.88 LBS | DIASTOLIC BLOOD PRESSURE: 73 MMHG | SYSTOLIC BLOOD PRESSURE: 122 MMHG | OXYGEN SATURATION: 100 % | HEART RATE: 87 BPM | BODY MASS INDEX: 34.76 KG/M2 | TEMPERATURE: 98 F | RESPIRATION RATE: 18 BRPM

## 2023-08-30 DIAGNOSIS — N20.0 KIDNEY STONE ON LEFT SIDE: Primary | ICD-10-CM

## 2023-08-30 DIAGNOSIS — R07.9 ACUTE CHEST PAIN: ICD-10-CM

## 2023-08-30 LAB
ALBUMIN SERPL BCP-MCNC: 4 G/DL (ref 3.5–5.2)
ALP SERPL-CCNC: 110 U/L (ref 55–135)
ALT SERPL W/O P-5'-P-CCNC: 20 U/L (ref 10–44)
ANION GAP SERPL CALC-SCNC: 8 MMOL/L (ref 8–16)
AST SERPL-CCNC: 18 U/L (ref 10–40)
B-HCG UR QL: NEGATIVE
BASOPHILS # BLD AUTO: 0.03 K/UL (ref 0–0.2)
BASOPHILS NFR BLD: 0.3 % (ref 0–1.9)
BILIRUB SERPL-MCNC: 0.5 MG/DL (ref 0.1–1)
BILIRUB UR QL STRIP: NEGATIVE
BNP SERPL-MCNC: <10 PG/ML (ref 0–99)
BUN SERPL-MCNC: 7 MG/DL (ref 6–20)
CALCIUM SERPL-MCNC: 9 MG/DL (ref 8.7–10.5)
CHLORIDE SERPL-SCNC: 106 MMOL/L (ref 95–110)
CLARITY UR: ABNORMAL
CO2 SERPL-SCNC: 25 MMOL/L (ref 23–29)
COLOR UR: YELLOW
CREAT SERPL-MCNC: 0.8 MG/DL (ref 0.5–1.4)
D DIMER PPP IA.FEU-MCNC: 0.45 MG/L FEU
DIFFERENTIAL METHOD: ABNORMAL
EOSINOPHIL # BLD AUTO: 0.2 K/UL (ref 0–0.5)
EOSINOPHIL NFR BLD: 1.9 % (ref 0–8)
ERYTHROCYTE [DISTWIDTH] IN BLOOD BY AUTOMATED COUNT: 11.8 % (ref 11.5–14.5)
EST. GFR  (NO RACE VARIABLE): >60 ML/MIN/1.73 M^2
GLUCOSE SERPL-MCNC: 83 MG/DL (ref 70–110)
GLUCOSE UR QL STRIP: NEGATIVE
HCT VFR BLD AUTO: 44.7 % (ref 37–48.5)
HGB BLD-MCNC: 14.8 G/DL (ref 12–16)
HGB UR QL STRIP: NEGATIVE
IMM GRANULOCYTES # BLD AUTO: 0.04 K/UL (ref 0–0.04)
IMM GRANULOCYTES NFR BLD AUTO: 0.4 % (ref 0–0.5)
KETONES UR QL STRIP: NEGATIVE
LEUKOCYTE ESTERASE UR QL STRIP: NEGATIVE
LIPASE SERPL-CCNC: 18 U/L (ref 4–60)
LYMPHOCYTES # BLD AUTO: 2.7 K/UL (ref 1–4.8)
LYMPHOCYTES NFR BLD: 26.8 % (ref 18–48)
MCH RBC QN AUTO: 30.6 PG (ref 27–31)
MCHC RBC AUTO-ENTMCNC: 33.1 G/DL (ref 32–36)
MCV RBC AUTO: 93 FL (ref 82–98)
MONOCYTES # BLD AUTO: 0.8 K/UL (ref 0.3–1)
MONOCYTES NFR BLD: 7.5 % (ref 4–15)
NEUTROPHILS # BLD AUTO: 6.3 K/UL (ref 1.8–7.7)
NEUTROPHILS NFR BLD: 63.1 % (ref 38–73)
NITRITE UR QL STRIP: NEGATIVE
NRBC BLD-RTO: 0 /100 WBC
PH UR STRIP: 7 [PH] (ref 5–8)
PLATELET # BLD AUTO: 278 K/UL (ref 150–450)
PMV BLD AUTO: 8.5 FL (ref 9.2–12.9)
POTASSIUM SERPL-SCNC: 4.2 MMOL/L (ref 3.5–5.1)
PROT SERPL-MCNC: 7.6 G/DL (ref 6–8.4)
PROT UR QL STRIP: NEGATIVE
RBC # BLD AUTO: 4.83 M/UL (ref 4–5.4)
SODIUM SERPL-SCNC: 139 MMOL/L (ref 136–145)
SP GR UR STRIP: 1.02 (ref 1–1.03)
TROPONIN I SERPL DL<=0.01 NG/ML-MCNC: <0.006 NG/ML (ref 0–0.03)
URN SPEC COLLECT METH UR: ABNORMAL
UROBILINOGEN UR STRIP-ACNC: 1 EU/DL
WBC # BLD AUTO: 9.94 K/UL (ref 3.9–12.7)

## 2023-08-30 PROCEDURE — 63600175 PHARM REV CODE 636 W HCPCS: Performed by: STUDENT IN AN ORGANIZED HEALTH CARE EDUCATION/TRAINING PROGRAM

## 2023-08-30 PROCEDURE — 93005 ELECTROCARDIOGRAM TRACING: CPT

## 2023-08-30 PROCEDURE — 80053 COMPREHEN METABOLIC PANEL: CPT | Performed by: STUDENT IN AN ORGANIZED HEALTH CARE EDUCATION/TRAINING PROGRAM

## 2023-08-30 PROCEDURE — 99285 EMERGENCY DEPT VISIT HI MDM: CPT | Mod: 25

## 2023-08-30 PROCEDURE — 83880 ASSAY OF NATRIURETIC PEPTIDE: CPT | Performed by: STUDENT IN AN ORGANIZED HEALTH CARE EDUCATION/TRAINING PROGRAM

## 2023-08-30 PROCEDURE — 85025 COMPLETE CBC W/AUTO DIFF WBC: CPT | Performed by: STUDENT IN AN ORGANIZED HEALTH CARE EDUCATION/TRAINING PROGRAM

## 2023-08-30 PROCEDURE — 96374 THER/PROPH/DIAG INJ IV PUSH: CPT

## 2023-08-30 PROCEDURE — 83690 ASSAY OF LIPASE: CPT | Performed by: STUDENT IN AN ORGANIZED HEALTH CARE EDUCATION/TRAINING PROGRAM

## 2023-08-30 PROCEDURE — 93010 ELECTROCARDIOGRAM REPORT: CPT | Mod: ,,, | Performed by: INTERNAL MEDICINE

## 2023-08-30 PROCEDURE — 81025 URINE PREGNANCY TEST: CPT | Performed by: STUDENT IN AN ORGANIZED HEALTH CARE EDUCATION/TRAINING PROGRAM

## 2023-08-30 PROCEDURE — 96375 TX/PRO/DX INJ NEW DRUG ADDON: CPT

## 2023-08-30 PROCEDURE — 81003 URINALYSIS AUTO W/O SCOPE: CPT | Performed by: STUDENT IN AN ORGANIZED HEALTH CARE EDUCATION/TRAINING PROGRAM

## 2023-08-30 PROCEDURE — 84484 ASSAY OF TROPONIN QUANT: CPT | Performed by: STUDENT IN AN ORGANIZED HEALTH CARE EDUCATION/TRAINING PROGRAM

## 2023-08-30 PROCEDURE — 96361 HYDRATE IV INFUSION ADD-ON: CPT

## 2023-08-30 PROCEDURE — 93010 EKG 12-LEAD: ICD-10-PCS | Mod: ,,, | Performed by: INTERNAL MEDICINE

## 2023-08-30 PROCEDURE — 25000003 PHARM REV CODE 250: Performed by: STUDENT IN AN ORGANIZED HEALTH CARE EDUCATION/TRAINING PROGRAM

## 2023-08-30 PROCEDURE — 85379 FIBRIN DEGRADATION QUANT: CPT | Performed by: STUDENT IN AN ORGANIZED HEALTH CARE EDUCATION/TRAINING PROGRAM

## 2023-08-30 RX ORDER — KETOROLAC TROMETHAMINE 30 MG/ML
15 INJECTION, SOLUTION INTRAMUSCULAR; INTRAVENOUS
Status: COMPLETED | OUTPATIENT
Start: 2023-08-30 | End: 2023-08-30

## 2023-08-30 RX ORDER — KETOROLAC TROMETHAMINE 10 MG/1
10 TABLET, FILM COATED ORAL EVERY 6 HOURS
Qty: 20 TABLET | Refills: 0 | Status: SHIPPED | OUTPATIENT
Start: 2023-08-30 | End: 2023-09-04

## 2023-08-30 RX ORDER — ONDANSETRON 2 MG/ML
4 INJECTION INTRAMUSCULAR; INTRAVENOUS
Status: COMPLETED | OUTPATIENT
Start: 2023-08-30 | End: 2023-08-30

## 2023-08-30 RX ADMIN — KETOROLAC TROMETHAMINE 15 MG: 30 INJECTION, SOLUTION INTRAMUSCULAR; INTRAVENOUS at 10:08

## 2023-08-30 RX ADMIN — SODIUM CHLORIDE 1000 ML: 9 INJECTION, SOLUTION INTRAVENOUS at 10:08

## 2023-08-30 RX ADMIN — ONDANSETRON 4 MG: 2 INJECTION INTRAMUSCULAR; INTRAVENOUS at 10:08

## 2023-08-30 NOTE — Clinical Note
"Cahri Schaefera" Marcel was seen and treated in our emergency department on 8/30/2023.  She may return to work on 08/31/2023.       If you have any questions or concerns, please don't hesitate to call.      Jovon Sheikh, DO"
"Chari Hilton (Lena)f was seen and treated in our emergency department on 8/30/2023.  She may return to work on 08/31/2023.       If you have any questions or concerns, please don't hesitate to call.       RN    "
Yes - the patient is able to be screened

## 2023-08-30 NOTE — ED PROVIDER NOTES
Encounter Date: 8/30/2023       History     Chief Complaint   Patient presents with    Chest Pain     Patient to ER CC of chest pain and right lower back pain and vomiting that started Sunday night     18-year-old female with a history of ADD, anxiety presents to the emergency department with left chest pain, left lower back pain, nausea, vomiting.  She was working all day yesterday when she felt left upper chest tightness.  She is on OCPs.  Described as a tightness that does not radiate.  She states that she was having shortness of breath and gasping as well.  She still is having shortness of breath.  She denies any smoking or first-degree relatives with heart attacks. Mom does have CHF.    She also presents with left lower back pain that started as well.  She states that a couple months ago, she was diagnosed with 2 kidney stones but does not know the size or location of the thumb.  She states that this feels similar to another kidney stone.  She denies any vaginal discharge, vaginal bleeding, diarrhea.  She vomited 3 times yesterday and one time today, nonbloody.    She is requesting HIV and TB testing for school.        Review of patient's allergies indicates:   Allergen Reactions    Shellfish derived Swelling     Past Medical History:   Diagnosis Date    ADD (attention deficit disorder)     Allergy     seasonal    Anxiety     Eczema      Past Surgical History:   Procedure Laterality Date    ARTHROSCOPIC DEBRIDEMENT OF WRIST Right 4/26/2021    Procedure: ARTHROSCOPY, WRIST, WITH DEBRIDEMENT, right;  Surgeon: Vale Rich MD;  [facility]: [facility];  Service: Orthopedics;  Laterality: Right;  Regional/[facility]    ARTHROSCOPY OF KNEE Right 6/27/2018    Procedure: ARTHROSCOPY, KNEE;  Surgeon: Adrian Shannon MD;  Location: [address];  Service: Orthopedics;  Laterality: Right;    COLONOSCOPY N/A 1/22/2019    Procedure: COLONOSCOPY;  Surgeon: Miko Parmar MD;  Location: [facility];  Service: Endoscopy;   Laterality: N/A;    ESOPHAGOGASTRODUODENOSCOPY N/A 1/22/2019    Procedure: ESOPHAGOGASTRODUODENOSCOPY (EGD);  Surgeon: Miko Parmar MD;  Location: [facility];  Service: Endoscopy;  Laterality: N/A;    FEMUR OSTEOTOMY Right 2/27/2020    Procedure: OSTEOTOMY, FEMUR (RIGHT) - correction of genu valgum.  Orthopediatrics distal femur plate.  MTF Thorpe wedges.;  Surgeon: Adrian Shannon MD;  Location: [facility] 1ST FLR;  Service: Orthopedics;  Laterality: Right;  [facility] notified 2/21 MAL    OSTEOTOMY AND ULNAR SHORTENING Right 9/17/2021    Procedure: OSTEOTOMY, ULNA, FOR SHORTENING,RIGHT;  Surgeon: Vale Rich MD;  Location: [facility];  Service: Orthopedics;  Laterality: Right;  MAC/REGIONAL     REPAIR OF MENISCUS OF KNEE Right 6/27/2018    Procedure: REPAIR, MENISCUS, KNEE-- medial;  Surgeon: Adrian Shannon MD;  Location: [facility] 2ND FLR;  Service: Orthopedics;  Laterality: Right;    TONSILLECTOMY, ADENOIDECTOMY      TYMPANOSTOMY TUBE PLACEMENT       Family History   Problem Relation Age of Onset    ADD / ADHD Mother     Asthma Mother     Diabetes Mother     Eczema Mother     Thyroid disease Maternal Grandmother     Diabetes Maternal Grandfather     No Known Problems Father     No Known Problems Sister     No Known Problems Brother     No Known Problems Maternal Aunt     No Known Problems Maternal Uncle     No Known Problems Paternal Aunt     No Known Problems Paternal Uncle     No Known Problems Paternal Grandmother     No Known Problems Paternal Grandfather     Alcohol abuse Neg Hx     Allergies Neg Hx     Autism spectrum disorder Neg Hx     Behavior problems Neg Hx     Birth defects Neg Hx     Cancer Neg Hx     Chromosomal disorder Neg Hx     Cleft lip Neg Hx     Congenital heart disease Neg Hx     Depression Neg Hx     Early death Neg Hx     Hearing loss Neg Hx     Heart disease Neg Hx     Hyperlipidemia Neg Hx     Hypertension Neg Hx     Kidney disease Neg Hx     Learning disabilities Neg Hx      Mental illness Neg Hx     Migraines Neg Hx     Neurodegenerative disease Neg Hx     Obesity Neg Hx     Seizures Neg Hx     SIDS Neg Hx     Other Neg Hx      Social History     Tobacco Use    Smoking status: Never    Smokeless tobacco: Never   Substance Use Topics    Alcohol use: No    Drug use: No     Review of Systems   Constitutional:  Negative for chills and fever.   HENT:  Negative for congestion, rhinorrhea and sneezing.    Eyes:  Negative for discharge and redness.   Respiratory:  Positive for shortness of breath. Negative for cough.    Cardiovascular:  Positive for chest pain. Negative for palpitations.   Gastrointestinal:  Positive for nausea and vomiting. Negative for abdominal pain and diarrhea.   Genitourinary:  Negative for dysuria, frequency, vaginal bleeding and vaginal discharge.   Musculoskeletal:  Positive for back pain. Negative for neck pain.   Skin:  Negative for rash and wound.   Neurological:  Negative for weakness, numbness and headaches.       Physical Exam     Initial Vitals [08/30/23 1013]   BP Pulse Resp Temp SpO2   125/73 99 18 98.4 °F (36.9 °C) 98 %      MAP       --         Physical Exam    Nursing note and vitals reviewed.  Constitutional: She appears well-developed. She is not diaphoretic. No distress.   HENT:   Head: Normocephalic and atraumatic.   Right Ear: External ear normal.   Left Ear: External ear normal.   Eyes: Right eye exhibits no discharge. Left eye exhibits no discharge. No scleral icterus.   Neck: Neck supple.   Cardiovascular:  Normal rate and regular rhythm.           Pulmonary/Chest: Breath sounds normal. No stridor. No respiratory distress. She has no wheezes. She has no rhonchi. She has no rales.   Abdominal: Abdomen is soft. There is no abdominal tenderness.   No right CVA tenderness.  No left CVA tenderness. There is no guarding.   Musculoskeletal:         General: No edema.      Cervical back: Neck supple.      Lumbar back: Tenderness (left lower) present.       Comments: Ambulatory     Neurological: She is alert and oriented to person, place, and time.   Skin: Skin is warm and dry. Capillary refill takes less than 2 seconds.   Psychiatric: She has a normal mood and affect.         ED Course   Procedures  Labs Reviewed   URINALYSIS, REFLEX TO URINE CULTURE - Abnormal; Notable for the following components:       Result Value    Appearance, UA Hazy (*)     All other components within normal limits    Narrative:     Specimen Source->Urine   CBC W/ AUTO DIFFERENTIAL - Abnormal; Notable for the following components:    MPV 8.5 (*)     All other components within normal limits   PREGNANCY TEST, URINE RAPID    Narrative:     Specimen Source->Urine   COMPREHENSIVE METABOLIC PANEL   LIPASE   D DIMER, QUANTITATIVE   TROPONIN I   B-TYPE NATRIURETIC PEPTIDE     EKG Readings: (Independently Interpreted)   Previous EKG: Compared with most recent EKG Previous EKG Date: 3/16/2023.   NSR at 100 bpm. Normal axis. Normal intervals. No STEMI.     ECG Results              EKG 12-lead (Final result)  Result time 08/30/23 15:00:03      Final result by Interface, Lab In [facility] (08/30/23 15:00:03)                   Narrative:    Test Reason : R07.9    Vent. Rate : 100 BPM     Atrial Rate : 100 BPM     P-R Int : 146 ms          QRS Dur : 070 ms      QT Int : 324 ms       P-R-T Axes : 064 004 047 degrees     QTc Int : 417 ms    Normal sinus rhythm  Normal ECG  When compared with ECG of 16-MAR-2023 10:52,  No significant change was found  Confirmed by Adithya SNELL, Gene WASHINGTON ([id number]) on 8/30/2023 2:59:51 PM    Referred By: [email or web address]   SELF           Confirmed By:Gene Haji MD                                  Imaging Results              X-Ray Chest AP Portable (Final result)  Result time 08/30/23 12:07:57      Final result by Kar Yu Jr., MD (08/30/23 12:07:57)                   Impression:      No acute abnormality.      Electronically signed by: Kar Yu  MD  Date:    08/30/2023  Time:    12:07               Narrative:    EXAMINATION:  XR CHEST AP PORTABLE    CLINICAL HISTORY:  Chest pain, unspecified    TECHNIQUE:  Single frontal view of the chest was performed.    COMPARISON:  None    FINDINGS:  The lungs are clear, with normal appearance of pulmonary vasculature and no pleural effusion or pneumothorax.    The cardiac silhouette is normal in size. The hilar and mediastinal contours are unremarkable.    Bones are intact.                                       CT Renal Stone Study ABD Pelvis WO (Final result)  Result time 08/30/23 12:07:17      Final result by Kar Yu Jr., MD (08/30/23 12:07:17)                   Impression:      1.  2 mm nonobstructing calcification lower pole left kidney.    2.  No evidence of obstructive genitourinary pathology.    3.  Cluster grouping of 5-7 mm lymph nodes established in the patient's right lower quadrant mesentery attributed towards mesenteric adenitis, though low-grade viral load may conceivably be affiliated with these findings.  No evidence of appendicitis or other acute findings restricted towards the right lower quadrant mesentery.      Electronically signed by: Kar Yu MD  Date:    08/30/2023  Time:    12:07               Narrative:    EXAMINATION:  CT RENAL STONE STUDY ABD PELVIS WO    CLINICAL HISTORY:  Flank pain, kidney stone suspected;    TECHNIQUE:  Low dose axial images, sagittal and coronal reformations were obtained from the lung bases to the pubic symphysis.  Lack of intravenous contrast for the examination limits diagnostic assessment of the intra-abdominal organs for solid organ perfusion abnormalities or vascular anomalies that should be taken account upon review of this particular imaging examination.    COMPARISON:  Comparison made with the patient's previous cross-sectional study of 09/23/2022.    FINDINGS:  Heart: Normal in size. No pericardial effusion.    Lung Bases: Well aerated,  without consolidation or pleural fluid.    Liver: Normal in size and attenuation, with no focal hepatic lesions.    Gallbladder: No calcified gallstones.    Bile Ducts: No evidence of dilated ducts.    Pancreas: No mass or peripancreatic fat stranding.    Spleen: Unremarkable.    Adrenals: Unremarkable.    Kidneys/ Ureters: Normal size and contour of the bilateral kidneys without evidence of contour deforming mass, hydronephrosis, nor significant perinephric stranding.  2 mm nonobstructing calcification lies in the lower pole the left kidney (series 3, image number 67).    Bladder: No evidence of wall thickening.    Reproductive organs: Unremarkable.    GI Tract/Mesentery: No evidence of bowel obstruction or inflammation.  Small cluster nodes are depicted in the patient's right lower quadrant mesentery in the 5-7 mm range attributed towards manifestations of mesenteric adenitis, though the possibility low-grade viral entity cannot be excluded on the basis of the imaging findings.    Peritoneal Space: No ascites. No free air.    Retroperitoneum: No significant adenopathy.    Abdominal wall: Unremarkable.    Vasculature: No significant atherosclerosis or aneurysm.    Bones: No acute fracture.                                       Medications   ketorolac injection 15 mg (15 mg Intravenous Given 8/30/23 1039)   ondansetron injection 4 mg (4 mg Intravenous Given 8/30/23 1040)   sodium chloride 0.9% bolus 1,000 mL 1,000 mL (0 mLs Intravenous Stopped 8/30/23 1114)     Medical Decision Making  Ddx: ACS, PE, CHF, anxiety, PTX, ureteral stone, UTI, colitis, peritonitis    Based on the patient's evaluation - patient appears well for discharge home. Labs were overall unimpressive. Negative d-dimer, trop, ECG. CT with 2mm left kidney stone and mesenteritic adenitis. She is not having abdominal pain.     Updated patient regarding her findings. Will need PCP f/u and toradol rx. HIV, TB testing can be done by PCP and not in the  emergency room. Patient is in agreement.    Amount and/or Complexity of Data Reviewed  Labs: ordered.  Radiology: ordered.    Risk  Prescription drug management.                               Clinical Impression:   Final diagnoses:  [R07.9] Acute chest pain  [N20.0] Kidney stone on left side (Primary)        ED Disposition Condition    Discharge Stable          ED Prescriptions       Medication Sig Dispense Start Date End Date Auth. Provider    ketorolac (TORADOL) 10 mg tablet Take 1 tablet (10 mg total) by mouth every 6 (six) hours. for 5 days 20 tablet 8/30/2023 9/4/2023 Jovon Sheikh DO          Follow-up Information       Follow up With Specialties Details Why Contact Info    Your primary care provider  Schedule an appointment as soon as possible for a visit in 1 week               Jovon Sheikh DO  08/30/23 7915

## 2023-09-22 ENCOUNTER — TELEPHONE (OUTPATIENT)
Dept: OBSTETRICS AND GYNECOLOGY | Facility: CLINIC | Age: 18
End: 2023-09-22
Payer: MEDICAID

## 2023-09-22 NOTE — TELEPHONE ENCOUNTER
----- Message from Radha Rangel sent at 9/22/2023 12:22 PM CDT -----  Pt is requesting a call back concerning her birth control. Call back at [phone]

## 2023-09-22 NOTE — TELEPHONE ENCOUNTER
Returned pt phone call pt stated her current birth control makes her feel nausea and sick and wanted to see I there was another birth control pill she could switch to instead offered pt appt with provider to discuss this pt accepted pt stated she now lives in [place], [place] so a VV would be best. Scheduled pt for 9/27 at 11:45 for VV with provider repeated appt details back to pt pt verbalized understanding.

## 2023-09-27 ENCOUNTER — OFFICE VISIT (OUTPATIENT)
Dept: OBSTETRICS AND GYNECOLOGY | Facility: CLINIC | Age: 18
End: 2023-09-27
Payer: MEDICAID

## 2023-09-27 VITALS — HEIGHT: 65 IN | BODY MASS INDEX: 34.78 KG/M2 | WEIGHT: 208.75 LBS

## 2023-09-27 DIAGNOSIS — Z30.017 ENCOUNTER FOR INITIAL PRESCRIPTION OF IMPLANTABLE SUBDERMAL CONTRACEPTIVE: Primary | ICD-10-CM

## 2023-09-27 PROBLEM — M25.569 ARTHRALGIA OF KNEE: Status: ACTIVE | Noted: 2023-09-27

## 2023-09-27 PROCEDURE — 1159F MED LIST DOCD IN RCRD: CPT | Mod: CPTII,95,, | Performed by: NURSE PRACTITIONER

## 2023-09-27 PROCEDURE — 1160F PR REVIEW ALL MEDS BY PRESCRIBER/CLIN PHARMACIST DOCUMENTED: ICD-10-PCS | Mod: CPTII,95,, | Performed by: NURSE PRACTITIONER

## 2023-09-27 PROCEDURE — 3008F PR BODY MASS INDEX (BMI) DOCUMENTED: ICD-10-PCS | Mod: CPTII,95,, | Performed by: NURSE PRACTITIONER

## 2023-09-27 PROCEDURE — 1160F RVW MEDS BY RX/DR IN RCRD: CPT | Mod: CPTII,95,, | Performed by: NURSE PRACTITIONER

## 2023-09-27 PROCEDURE — 99213 PR OFFICE/OUTPT VISIT, EST, LEVL III, 20-29 MIN: ICD-10-PCS | Mod: 95,,, | Performed by: NURSE PRACTITIONER

## 2023-09-27 PROCEDURE — 99213 OFFICE O/P EST LOW 20 MIN: CPT | Mod: 95,,, | Performed by: NURSE PRACTITIONER

## 2023-09-27 PROCEDURE — 3008F BODY MASS INDEX DOCD: CPT | Mod: CPTII,95,, | Performed by: NURSE PRACTITIONER

## 2023-09-27 PROCEDURE — 1159F PR MEDICATION LIST DOCUMENTED IN MEDICAL RECORD: ICD-10-PCS | Mod: CPTII,95,, | Performed by: NURSE PRACTITIONER

## 2023-09-27 NOTE — PROGRESS NOTES
Subjective:       Patient ID: Chari Rod is a 18 y.o. female.    Chief Complaint:  Contraception (Would like to switch back to nexplanon implant )      History of Present Illness  HPI  The patient location is: [place], [place] at home  The chief complaint leading to consultation is: contraception    Visit type: audiovisual    Face to Face time with patient: 14 minutes of total time spent on the encounter, which includes face to face time and non-face to face time preparing to see the patient (eg, review of tests), Obtaining and/or reviewing separately obtained history, Documenting clinical information in the electronic or other health record, Independently interpreting results (not separately reported) and communicating results to the patient/family/caregiver, or Care coordination (not separately reported).         Each patient to whom he or she provides medical services by telemedicine is:  (1) informed of the relationship between the physician and patient and the respective role of any other health care provider with respect to management of the patient; and (2) notified that he or she may decline to receive medical services by telemedicine and may withdraw from such care at any time.    Notes:   G0 present to discuss birth control  Stopped junel fe 3d ago  Was experiencing nausea and decreased appetite  Does always experience nausea; has slightly improved  Interested in another nexplanon  Had it removed for arm pain  Feels like she hit her arm and that caused the pain  Was amenorrheic and did not have to worry about remembering    GYN & OB History  Patient's last menstrual period was 2023 (approximate).   Date of Last Pap: No result found    OB History    Para Term  AB Living   0 0 0 0 0 0   SAB IAB Ectopic Multiple Live Births   0 0 0 0 0       Review of Systems  Review of Systems   Constitutional:  Positive for appetite change.   Gastrointestinal:  Positive for nausea.    Genitourinary:  Negative for menstrual problem.           Objective:      Physical Exam:   Constitutional: She is oriented to person, place, and time. She appears well-developed and well-nourished. No distress.    HENT:   Head: Normocephalic and atraumatic.    Eyes: Pupils are equal, round, and reactive to light. Conjunctivae and EOM are normal.      Pulmonary/Chest: Effort normal.                  Musculoskeletal: Normal range of motion.       Neurological: She is alert and oriented to person, place, and time.    Skin: She is not diaphoretic.    Psychiatric: She has a normal mood and affect. Her behavior is normal. Judgment and thought content normal.           Assessment:        1. Encounter for initial prescription of implantable subdermal contraceptive               Plan:   Discussed contraception options with patient including pills, patch, ring, Depo Provera, Implanon, Mirena, and Paragard.  Discussed risks of DVT development with birth control use.  Pt denies history of blood clots, DVT, cardiac issues, HTN, CVA, gallbladder disease, liver disease, smoking, migraines with an aura, or adrenal disease.  Pt denies family hx of DVT.  Pt chose nexplanon    Device ordered  Expecting menses in the next couple days r/t stopping OCPs  Appt scheduled  Safe sex practices discussed.    Continue annual well woman exam.    Encounter for initial prescription of implantable subdermal contraceptive  -     Device Authorization Order

## 2023-10-02 ENCOUNTER — TELEPHONE (OUTPATIENT)
Dept: OBSTETRICS AND GYNECOLOGY | Facility: CLINIC | Age: 18
End: 2023-10-02
Payer: MEDICAID

## 2023-10-02 NOTE — TELEPHONE ENCOUNTER
Called patient and she states she will not be on her cycle for Friday because she started 3 days ago. She would like to reschedule her nexplanon insertion sooner. Spoke to JUDI Solis. Ok to schedule patient for 10/03/23 [facility] at 9am. Patient verbalized understanding. Appointment spot held. Email sent to schedule.

## 2023-10-02 NOTE — TELEPHONE ENCOUNTER
----- Message from Shauna Christianson sent at 9/30/2023 12:27 PM CDT -----  Regarding: Patient advice  Contact: Pt  Pt called in regards to rescheduling appointment       Pt can be reached at [phone]

## 2023-10-03 ENCOUNTER — PROCEDURE VISIT (OUTPATIENT)
Dept: OBSTETRICS AND GYNECOLOGY | Facility: CLINIC | Age: 18
End: 2023-10-03
Payer: MEDICAID

## 2023-10-03 VITALS
DIASTOLIC BLOOD PRESSURE: 82 MMHG | HEIGHT: 65 IN | WEIGHT: 207.88 LBS | BODY MASS INDEX: 34.63 KG/M2 | SYSTOLIC BLOOD PRESSURE: 134 MMHG

## 2023-10-03 DIAGNOSIS — Z30.017 NEXPLANON INSERTION: Primary | ICD-10-CM

## 2023-10-03 DIAGNOSIS — Z11.3 ENCOUNTER FOR SCREENING FOR INFECTIONS WITH PREDOMINANTLY SEXUAL MODE OF TRANSMISSION: ICD-10-CM

## 2023-10-03 DIAGNOSIS — Z30.017 ENCOUNTER FOR INITIAL PRESCRIPTION OF IMPLANTABLE SUBDERMAL CONTRACEPTIVE: ICD-10-CM

## 2023-10-03 LAB
B-HCG UR QL: NEGATIVE
CTP QC/QA: YES

## 2023-10-03 PROCEDURE — 11981 INSERTION OF NEXPLANON: ICD-10-PCS | Mod: S$PBB,,, | Performed by: NURSE PRACTITIONER

## 2023-10-03 PROCEDURE — 87591 N.GONORRHOEAE DNA AMP PROB: CPT | Performed by: NURSE PRACTITIONER

## 2023-10-03 PROCEDURE — 99999PBSHW PR PBB SHADOW TECHNICAL ONLY FILED TO HB: ICD-10-PCS | Mod: PBBFAC,,,

## 2023-10-03 PROCEDURE — 99999PBSHW POCT URINE PREGNANCY: Mod: PBBFAC,,,

## 2023-10-03 PROCEDURE — 11981 INSERTION DRUG DLVR IMPLANT: CPT | Mod: S$PBB,,, | Performed by: NURSE PRACTITIONER

## 2023-10-03 PROCEDURE — 87491 CHLMYD TRACH DNA AMP PROBE: CPT | Performed by: NURSE PRACTITIONER

## 2023-10-03 PROCEDURE — 81025 URINE PREGNANCY TEST: CPT | Mod: PBBFAC,PO | Performed by: NURSE PRACTITIONER

## 2023-10-03 PROCEDURE — 11981 INSERTION DRUG DLVR IMPLANT: CPT | Mod: PBBFAC,PO | Performed by: NURSE PRACTITIONER

## 2023-10-03 PROCEDURE — 99999PBSHW PR PBB SHADOW TECHNICAL ONLY FILED TO HB: Mod: PBBFAC,,,

## 2023-10-03 RX ADMIN — ETONOGESTREL 68 MG: 68 IMPLANT SUBCUTANEOUS at 09:10

## 2023-10-03 NOTE — LETTER
October 3, 2023      [facility]  [address] [place] [address]  Phone: [phone]       Patient: Chari Rod   YOB: 2005  Date of Visit: 10/03/2023    To Whom It May Concern:    Shahla Rod  was at [facility] on 10/03/2023. The patient may return to work/school on 10/4/2023 with no restrictions. If you have any questions or concerns, or if I can be of further assistance, please do not hesitate to contact me.    Sincerely,          Irma Grewal NP

## 2023-10-03 NOTE — PROCEDURES
Insertion of Nexplanon    Date/Time: 10/3/2023 9:00 AM    Performed by: Irma Grewal NP  Authorized by: Irma Grewal NP    Consent obtained:  Written  Consent given by:  Patient  Patient questions answered: yes    Patient agrees, verbalizes understanding, and wants to proceed: yes    Educational handouts given: yes    Instructions and paperwork completed: yes    Pre-procedure timeout performed: yes    Prepped with: chlorhexidine gluconate    Local anesthetic:  Lidocaine with epinephrine  The site was cleaned and prepped in a sterile fashion: yes    Small stab incision was made in arm: no    Left/right:  Left   68 mg etonogestreL 68 mg  Preloaded Implanon trocar was placed subdermally: yes    Visualization of implant was obtained: yes    Nexplanon was inserted and trocar removed: yes    Visualization of notch in stilette and palpitation of device: yes    Palpitation confirms placement by provider and patient: yes    Site was closed with steri-strips and pressure bandage applied: yes     Pt tolerated well.

## 2023-10-06 LAB
C TRACH DNA SPEC QL NAA+PROBE: NOT DETECTED
N GONORRHOEA DNA SPEC QL NAA+PROBE: NOT DETECTED

## 2023-10-11 ENCOUNTER — HOSPITAL ENCOUNTER (EMERGENCY)
Facility: HOSPITAL | Age: 18
Discharge: HOME | End: 2023-10-11
Attending: STUDENT IN AN ORGANIZED HEALTH CARE EDUCATION/TRAINING PROGRAM
Payer: MEDICAID

## 2023-10-11 VITALS
DIASTOLIC BLOOD PRESSURE: 85 MMHG | RESPIRATION RATE: 18 BRPM | TEMPERATURE: 99 F | OXYGEN SATURATION: 100 % | BODY MASS INDEX: 34.96 KG/M2 | HEART RATE: 100 BPM | WEIGHT: 210.13 LBS | SYSTOLIC BLOOD PRESSURE: 149 MMHG

## 2023-10-11 DIAGNOSIS — G43.909 MIGRAINE WITHOUT STATUS MIGRAINOSUS, NOT INTRACTABLE, UNSPECIFIED MIGRAINE TYPE: Primary | ICD-10-CM

## 2023-10-11 LAB
ALBUMIN SERPL BCP-MCNC: 4 G/DL (ref 3.5–5.2)
ALP SERPL-CCNC: 101 U/L (ref 55–135)
ALT SERPL W/O P-5'-P-CCNC: 16 U/L (ref 10–44)
ANION GAP SERPL CALC-SCNC: 10 MMOL/L (ref 8–16)
AST SERPL-CCNC: 14 U/L (ref 10–40)
B-HCG UR QL: NEGATIVE
BASOPHILS # BLD AUTO: 0.03 K/UL (ref 0–0.2)
BASOPHILS NFR BLD: 0.3 % (ref 0–1.9)
BILIRUB SERPL-MCNC: 0.4 MG/DL (ref 0.1–1)
BILIRUB UR QL STRIP: NEGATIVE
BUN SERPL-MCNC: 7 MG/DL (ref 6–20)
CALCIUM SERPL-MCNC: 8.8 MG/DL (ref 8.7–10.5)
CHLORIDE SERPL-SCNC: 110 MMOL/L (ref 95–110)
CLARITY UR: CLEAR
CO2 SERPL-SCNC: 21 MMOL/L (ref 23–29)
COLOR UR: YELLOW
CREAT SERPL-MCNC: 0.8 MG/DL (ref 0.5–1.4)
DIFFERENTIAL METHOD: ABNORMAL
EOSINOPHIL # BLD AUTO: 0.2 K/UL (ref 0–0.5)
EOSINOPHIL NFR BLD: 1.9 % (ref 0–8)
ERYTHROCYTE [DISTWIDTH] IN BLOOD BY AUTOMATED COUNT: 12.1 % (ref 11.5–14.5)
EST. GFR  (NO RACE VARIABLE): >60 ML/MIN/1.73 M^2
GLUCOSE SERPL-MCNC: 80 MG/DL (ref 70–110)
GLUCOSE UR QL STRIP: NEGATIVE
HCT VFR BLD AUTO: 40.9 % (ref 37–48.5)
HGB BLD-MCNC: 13.6 G/DL (ref 12–16)
HGB UR QL STRIP: NEGATIVE
IMM GRANULOCYTES # BLD AUTO: 0.03 K/UL (ref 0–0.04)
IMM GRANULOCYTES NFR BLD AUTO: 0.3 % (ref 0–0.5)
INFLUENZA A, MOLECULAR: NEGATIVE
INFLUENZA B, MOLECULAR: NORMAL
KETONES UR QL STRIP: NEGATIVE
LEUKOCYTE ESTERASE UR QL STRIP: NEGATIVE
LYMPHOCYTES # BLD AUTO: 2.2 K/UL (ref 1–4.8)
LYMPHOCYTES NFR BLD: 21.1 % (ref 18–48)
MCH RBC QN AUTO: 31.1 PG (ref 27–31)
MCHC RBC AUTO-ENTMCNC: 33.3 G/DL (ref 32–36)
MCV RBC AUTO: 93 FL (ref 82–98)
MONOCYTES # BLD AUTO: 1 K/UL (ref 0.3–1)
MONOCYTES NFR BLD: 9.4 % (ref 4–15)
NEUTROPHILS # BLD AUTO: 6.8 K/UL (ref 1.8–7.7)
NEUTROPHILS NFR BLD: 67 % (ref 38–73)
NITRITE UR QL STRIP: NEGATIVE
NRBC BLD-RTO: 0 /100 WBC
PH UR STRIP: 8 [PH] (ref 5–8)
PLATELET # BLD AUTO: 338 K/UL (ref 150–450)
PMV BLD AUTO: 8.1 FL (ref 9.2–12.9)
POTASSIUM SERPL-SCNC: 3.6 MMOL/L (ref 3.5–5.1)
PROT SERPL-MCNC: 7.3 G/DL (ref 6–8.4)
PROT UR QL STRIP: NEGATIVE
RBC # BLD AUTO: 4.38 M/UL (ref 4–5.4)
SARS-COV-2 RDRP RESP QL NAA+PROBE: NEGATIVE
SODIUM SERPL-SCNC: 141 MMOL/L (ref 136–145)
SP GR UR STRIP: 1.02 (ref 1–1.03)
SPECIMEN SOURCE: NORMAL
URN SPEC COLLECT METH UR: NORMAL
UROBILINOGEN UR STRIP-ACNC: 1 EU/DL
WBC # BLD AUTO: 10.18 K/UL (ref 3.9–12.7)

## 2023-10-11 PROCEDURE — 25000003 PHARM REV CODE 250: Performed by: NURSE PRACTITIONER

## 2023-10-11 PROCEDURE — 36415 COLL VENOUS BLD VENIPUNCTURE: CPT | Performed by: NURSE PRACTITIONER

## 2023-10-11 PROCEDURE — 81003 URINALYSIS AUTO W/O SCOPE: CPT | Performed by: NURSE PRACTITIONER

## 2023-10-11 PROCEDURE — 80053 COMPREHEN METABOLIC PANEL: CPT | Performed by: NURSE PRACTITIONER

## 2023-10-11 PROCEDURE — 99284 EMERGENCY DEPT VISIT MOD MDM: CPT

## 2023-10-11 PROCEDURE — 85025 COMPLETE CBC W/AUTO DIFF WBC: CPT | Performed by: NURSE PRACTITIONER

## 2023-10-11 PROCEDURE — 87502 INFLUENZA DNA AMP PROBE: CPT | Performed by: NURSE PRACTITIONER

## 2023-10-11 PROCEDURE — U0002 COVID-19 LAB TEST NON-CDC: HCPCS | Performed by: NURSE PRACTITIONER

## 2023-10-11 PROCEDURE — 81025 URINE PREGNANCY TEST: CPT | Performed by: NURSE PRACTITIONER

## 2023-10-11 RX ORDER — BUTALBITAL, ACETAMINOPHEN AND CAFFEINE 50; 325; 40 MG/1; MG/1; MG/1
1 TABLET ORAL
Status: COMPLETED | OUTPATIENT
Start: 2023-10-11 | End: 2023-10-11

## 2023-10-11 RX ORDER — ONDANSETRON 4 MG/1
4 TABLET, FILM COATED ORAL EVERY 6 HOURS
Qty: 12 TABLET | Refills: 0 | Status: SHIPPED | OUTPATIENT
Start: 2023-10-11 | End: 2023-11-14 | Stop reason: SDUPTHER

## 2023-10-11 RX ORDER — BUTALBITAL, ACETAMINOPHEN AND CAFFEINE 50; 325; 40 MG/1; MG/1; MG/1
1 TABLET ORAL
Status: DISCONTINUED | OUTPATIENT
Start: 2023-10-11 | End: 2023-10-11

## 2023-10-11 RX ORDER — BUTALBITAL, ACETAMINOPHEN AND CAFFEINE 50; 325; 40 MG/1; MG/1; MG/1
1 TABLET ORAL EVERY 4 HOURS PRN
Qty: 10 TABLET | Refills: 0 | Status: SHIPPED | OUTPATIENT
Start: 2023-10-11 | End: 2023-11-10

## 2023-10-11 RX ADMIN — BUTALBITAL, ACETAMINOPHEN, AND CAFFEINE 1 TABLET: 50; 325; 40 TABLET ORAL at 03:10

## 2023-10-11 NOTE — ED PROVIDER NOTES
Encounter Date: 10/11/2023       History     Chief Complaint   Patient presents with    Nausea     Pt arrived to ED c/o nausea, vomiting, and pounding headache that started 4 days ago. Pt denies diarrhea. Pt rates pain 6/10.     Chief complaint: Headache, nausea  18-year-old female history ED allergies anxiety asthma eczema, migraines presents to be evaluated for headache she is had for the last 3 days as well as intermittent nausea.  She reports she is a history of migraines and this feels somewhat similar.  She also reports some photophobia.  States she currently 7/10 deep aching pain exacerbated with lights and has no alleviating factors.  Reports taking ibuprofen with no improvement.  Denies any vomiting states that she thinks she gagging spit up her mouth.  Does report sinus congestion pressure.  Denies any fever cough  runny nose body aches or chills.  She reports she has been unable to take her usual migraine medicine because she was kicked out of her grandparent's house and was unable to obtain her personal belongings.  She does see Neurology.  She is full range of motion of the head and neck.  Denies worst headache of her life for sudden onset      Review of patient's allergies indicates:   Allergen Reactions    Iodine Swelling    Shellfish derived Swelling     Past Medical History:   Diagnosis Date    ADD (attention deficit disorder)     Allergy     seasonal    Anxiety     Eczema      Past Surgical History:   Procedure Laterality Date    ARTHROSCOPIC DEBRIDEMENT OF WRIST Right 4/26/2021    Procedure: ARTHROSCOPY, WRIST, WITH DEBRIDEMENT, right;  Surgeon: Vale Rich MD;  Location: [facility] OR;  Service: Orthopedics;  Laterality: Right;  Regional/MAC    ARTHROSCOPY OF KNEE Right 6/27/2018    Procedure: ARTHROSCOPY, KNEE;  Surgeon: Adrian Shannon MD;  Location: [facility] OR [address] [place] [address];  Service: Orthopedics;  Laterality: Right;    COLONOSCOPY N/A 1/22/2019    Procedure: COLONOSCOPY;  Surgeon: Miko Parmar MD;   Location: [facility];  Service: Endoscopy;  Laterality: N/A;    ESOPHAGOGASTRODUODENOSCOPY N/A 1/22/2019    Procedure: ESOPHAGOGASTRODUODENOSCOPY (EGD);  Surgeon: Miko Parmar MD;  Location: [facility];  Service: Endoscopy;  Laterality: N/A;    FEMUR OSTEOTOMY Right 2/27/2020    Procedure: OSTEOTOMY, FEMUR (RIGHT) - correction of genu valgum.  Orthopediatrics distal femur plate.  MTF [place]pe wedges.;  Surgeon: Adrian Shannon MD;  Location: [facility] 1ST FLR;  Service: Orthopedics;  Laterality: Right;  [facility] notified 2/21 MAL    OSTEOTOMY AND ULNAR SHORTENING Right 9/17/2021    Procedure: OSTEOTOMY, ULNA, FOR SHORTENING,RIGHT;  Surgeon: Vale Rich MD;  Location: [facility];  Service: Orthopedics;  Laterality: Right;  MAC/REGIONAL     REPAIR OF MENISCUS OF KNEE Right 6/27/2018    Procedure: REPAIR, MENISCUS, KNEE-- medial;  Surgeon: Adrian Shannon MD;  Location: [facility] 2ND FLR;  Service: Orthopedics;  Laterality: Right;    TONSILLECTOMY, ADENOIDECTOMY      TYMPANOSTOMY TUBE PLACEMENT       Family History   Problem Relation Age of Onset    ADD / ADHD Mother     Asthma Mother     Diabetes Mother     Eczema Mother     Thyroid disease Maternal Grandmother     Diabetes Maternal Grandfather     No Known Problems Father     No Known Problems Sister     No Known Problems Brother     No Known Problems Maternal Aunt     No Known Problems Maternal Uncle     No Known Problems Paternal Aunt     No Known Problems Paternal Uncle     No Known Problems Paternal Grandmother     No Known Problems Paternal Grandfather     Alcohol abuse Neg Hx     Allergies Neg Hx     Autism spectrum disorder Neg Hx     Behavior problems Neg Hx     Birth defects Neg Hx     Cancer Neg Hx     Chromosomal disorder Neg Hx     Cleft lip Neg Hx     Congenital heart disease Neg Hx     Depression Neg Hx     Early death Neg Hx     Hearing loss Neg Hx     Heart disease Neg Hx     Hyperlipidemia Neg Hx     Hypertension Neg Hx     Kidney disease  Neg Hx     Learning disabilities Neg Hx     Mental illness Neg Hx     Migraines Neg Hx     Neurodegenerative disease Neg Hx     Obesity Neg Hx     Seizures Neg Hx     SIDS Neg Hx     Other Neg Hx      Social History     Tobacco Use    Smoking status: Never    Smokeless tobacco: Never   Substance Use Topics    Alcohol use: No    Drug use: No     Review of Systems   Constitutional:  Negative for fatigue and fever.   HENT:  Positive for congestion. Negative for sore throat.    Eyes:  Positive for photophobia.   Respiratory:  Negative for cough.    Gastrointestinal:  Positive for nausea. Negative for abdominal pain, diarrhea and vomiting.   Neurological:  Positive for headaches. Negative for dizziness, syncope and weakness.       Physical Exam     Initial Vitals [10/11/23 1532]   BP Pulse Resp Temp SpO2   (!) 149/85 100 18 99.1 °F (37.3 °C) 100 %      MAP       --         Physical Exam    Nursing note and vitals reviewed.  Constitutional: She appears well-developed and well-nourished.   HENT:   Head: Normocephalic and atraumatic.   Cardiovascular:  Normal rate, regular rhythm and normal heart sounds.           Pulmonary/Chest: Breath sounds normal. She has no wheezes. She has no rhonchi. She has no rales.   Abdominal: Abdomen is soft. Bowel sounds are normal. She exhibits no distension and no mass. There is no abdominal tenderness. There is no rebound and no guarding.   Musculoskeletal:         General: Normal range of motion.     Neurological: She is alert and oriented to person, place, and time. She has normal strength. GCS score is 15. GCS eye subscore is 4. GCS verbal subscore is 5. GCS motor subscore is 6.         ED Course   Procedures  Labs Reviewed   CBC W/ AUTO DIFFERENTIAL - Abnormal; Notable for the following components:       Result Value    MCH 31.1 (*)     MPV 8.1 (*)     All other components within normal limits   COMPREHENSIVE METABOLIC PANEL - Abnormal; Notable for the following components:    CO2 21  (*)     All other components within normal limits   INFLUENZA A & B BY MOLECULAR   URINALYSIS, REFLEX TO URINE CULTURE    Narrative:     Specimen Source->Urine   PREGNANCY TEST, URINE RAPID    Narrative:     Specimen Source->Urine   SARS-COV-2 RNA AMPLIFICATION, QUAL          Imaging Results    None          Medications   butalbital-acetaminophen-caffeine -40 mg per tablet 1 tablet (1 tablet Oral Given 10/11/23 5422)     Medical Decision Making  [age] year female with history migraines presents to be evaluated for headache and some nausea she is had for the last several days.  She reports taking ibuprofen with no improvement in symptoms   Differential diagnosis include migraine, tension headache, stress, URI, gastroenteritis    Amount and/or Complexity of Data Reviewed  Labs: ordered.     Details: CBC, CMP UA, UPT, COVID, influenza    Risk  Risk Details: Patient negative for COVID flu in ED today   No leukocytosis no gross electrolyte abnormality   Patient has full range of motion no nuchal rigidity in ED today   Given Fioricet with complete resolution of headache   Stable for DC with close follow-up given return precautions                               Clinical Impression:   Final diagnoses:  [G43.909] Migraine without status migrainosus, not intractable, unspecified migraine type (Primary)               Hermelinda Dorantes NP  10/11/23 9240

## 2023-10-11 NOTE — Clinical Note
"Chari Schaefera" Marcel was seen and treated in our emergency department on 10/11/2023.  She may return to work on 10/12/2023.       If you have any questions or concerns, please don't hesitate to call.      Kyra GREGORY    "

## 2023-10-11 NOTE — ED TRIAGE NOTES
Pt arrived to ED c/o nausea, vomiting, and pounding headache that started 4 days ago. Pt denies diarrhea. Pt rates pain 6/10.

## 2023-10-12 ENCOUNTER — PATIENT OUTREACH (OUTPATIENT)
Dept: EMERGENCY MEDICINE | Facility: HOSPITAL | Age: 18
End: 2023-10-12
Payer: MEDICAID

## 2023-10-12 NOTE — PROGRESS NOTES
Milla Werner, Patient Care Assistant  ED Navigator  Emergency Department    Project: [facility] ED Navigator  Role: Community Health Worker    Date: 10/12/2023  Patient Name: Chari Rod  MRN: [id number]  PCP: Irma, Primary Doctor    Assessment:     Chari Rod is a 18 y.o. female who has presented to ED for nausea. Patient has visited the ED 3 times in the past 3 months. Patient did not contact PCP.     ED Navigator Initial Assessment    ED Navigator Enrollment Documentation  Consent to Services  Does patient consent to completing the assessment?: Yes  Contact  Method of Initial Contact: Phone  Transportation  Does the patient have issues with Transportation?: No  Does the patient have transportation to and from healthcare appointments?: Yes  Insurance Coverage  Do you have coverage/adequate coverage?: Yes  Type/kind of coverage: HEALTHY BLUE ([place]  Is patient able to afford co-pays/deductibles?: Yes  Is patient able to afford HME or supplies?: Yes  Does patient have an established [facility] PCP?: Yes  Able to access?: Yes  Does the patient have a lack of adequate coverage?: No  Specialist Appointment  Did the patient come to the ED to see a specialist?: No  Does the patient have a pending specialist referral?: Yes  Does the patient have a specialist appointment made?: No  PCP Follow Up Appointment  Has the patient had an appointment with a primary care provider in the past year?: Yes  Approximate date: 3/16/23  Provider: Aneta Low NP  Does the patient have a follow up appontment with a PCP?: Yes  Upcoming appointment date: 10/16/23  Provider: Aneta Low NP  When was the last time you saw your PCP?: 3/16/23  Medications  Is patient able to afford medication?: Yes  Is patient unable to get medication due to lack of transportation?: No  Psychological  Does the patient have psycho-social concerns?: Yes  What concerns does the patient have?: Anxiety and/or Depression  Food  Does the patient have  concerns about food?: No  Communication/Education  Does the patient have limited English proficiency/English not primary language?: No  Does patient have low literacy and/or low health literacy?: Yes  Does patient have concerns with care?: No  Does patient have dissatisfaction with care?: No  Other Financial Concerns  Does the patient have immediate financial distress?: No  Does the patient have general financial concerns?: No  Other Social Barriers/Concerns  Does the patient have any additional barriers or concerns?: None  Primary Barrier  Barriers identified: Psychological barrier (mistrust, anxiety, etc.), Cognitive barrier (health literacy, language and communication, etc.)  Root Cause of ED Utilization: Patient Knowledge/Low Health Literacy  Plan to address Patient Knowledge/Low Health Literacy: Provided information for [facility] 24/7 Nurse triage line [phone] or [facility] ([phone])  Next steps: Provided Education, Scheduled Appointment/Referral  Scheduled Appointment Date: 10/16/23  Was education/educational materials provided surrounding PCP services/creating a medical home?: Yes Was education verbal or written?: Verbal, Written     Was education/educational materials provided surrounding low cost, healthy foods?: Yes Was education verbal or written?: Written     Was education/educational materials provided surrounding other items? If so, use comment to explain.: Yes Was education verbal or written?: Written   Plan: Provided information for [facility] 24/7 Nurse triage line, [phone] or [facility] ([phone])  Expected Date of Follow Up 1: 10/26/23  Additional Documentation: Pt is still dealing with a headache, but the nausea is not as bad. Pt has seen a provider with [facility] within the year, which means she is established, but does not quite remember, as expressed. Pt agreed to schedule an appointment, as she does need to be seen, and stated so. Pt agreed to date/time of  appt. Pt did not need any resources at this present time, as identified. Pt does suffer with anxiety/depression, but did not want a list of MH facilities, as revealed her friend/family support helps her a lot to deal with it from day to day. Pt confirmed her e-mail address is still the same and is agreeable to a follow-up call soon.    ED navigator ensured to assist pt with her needs. ED navigator scheduled pt with Aneta Low NP for 10/16/2023 at 10:30 a.m. ED navigator provided patient with the following via e-mail: her appointment details, the [facility] 24/7 Nurse triage line, [phone] or [facility] ([phone]) contact information, and education on (The Right Care at the Right Level information, [facility] Virtual Visit information, and Heart healthy diet tips). ED navigator will follow-up with patient on/around 10/26/2023 to see how she is doing, to see how her appointment went, to see if she would like to cancel the appointment with Sheri Matias MD for 11/7, and to assist as needed.    Milla Werner  ED Navigator- [facility]  [phone]            Social History     Socioeconomic History    Marital status: Single   Tobacco Use    Smoking status: Never    Smokeless tobacco: Never   Substance and Sexual Activity    Alcohol use: No    Drug use: No    Sexual activity: Yes     Partners: Male     Birth control/protection: OCP   Social History Narrative    Lives with mother.    2 dogs    2 ferrets     Social Determinants of Health     Financial Resource Strain: Low Risk  (10/12/2023)    Overall Financial Resource Strain (CARDIA)     Difficulty of Paying Living Expenses: Not hard at all   Food Insecurity: No Food Insecurity (10/12/2023)    Hunger Vital Sign     Worried About Running Out of Food in the Last Year: Never true     Ran Out of Food in the Last Year: Never true   Transportation Needs: No Transportation Needs (10/12/2023)    PRAPARE - Transportation     Lack of  Transportation (Medical): No     Lack of Transportation (Non-Medical): No   Stress: Stress Concern Present (10/12/2023)    [place] [employer] [place] [employer] - Occupational Stress Questionnaire     Feeling of Stress : Very much   Social Connections: Moderately Isolated (10/12/2023)    Social Connection and Isolation Panel [NHANES]     Frequency of Communication with Friends and Family: More than three times a week     Frequency of Social Gatherings with Friends and Family: More than three times a week     Attends [employer]ist Services: Never     Active Member of Clubs or Organizations: No     Attends Club or Organization Meetings: Never     Marital Status: Living with partner   Housing Stability: Low Risk  (10/12/2023)    Housing Stability Vital Sign     Unable to Pay for Housing in the Last Year: No     Number of Places Lived in the Last Year: 2     Unstable Housing in the Last Year: No       Plan:       Pt is still dealing with a headache, but the nausea is not as bad. Pt has seen a provider with [facility] within the year, which means she is established, but does not quite remember, as expressed. Pt agreed to schedule an appointment, as she does need to be seen, and stated so. Pt agreed to date/time of appt. Pt did not need any resources at this present time, as identified. Pt does suffer with anxiety/depression, but did not want a list of MH facilities, as revealed her friend/family support helps her a lot to deal with it from day to day. Pt confirmed her e-mail address is still the same and is agreeable to a follow-up call soon.    ED navigator ensured to assist pt with her needs. ED navigator scheduled pt with Aneta Low NP for 10/16/2023 at 10:30 a.m. ED navigator provided patient with the following via e-mail: her appointment details, the [facility] 24/7 Nurse triage line, [phone] or [facility] ([phone]) contact information, and education on (The Right Care at the Right Level  information, [facility] Virtual Visit information, and Heart healthy diet tips). ED navigator will follow-up with patient on/around 10/26/2023 to see how she is doing, to see how her appointment went, to see if she would like to cancel the appointment with Sheri Matias MD for 11/7, and to assist as needed.    Milla Werner  ED Navigator- [place]  [phone]

## 2023-10-24 ENCOUNTER — HOSPITAL ENCOUNTER (EMERGENCY)
Facility: HOSPITAL | Age: 18
Discharge: HOME | End: 2023-10-24
Attending: SURGERY
Payer: MEDICAID

## 2023-10-24 VITALS
HEIGHT: 65 IN | WEIGHT: 207.56 LBS | BODY MASS INDEX: 34.58 KG/M2 | HEART RATE: 109 BPM | SYSTOLIC BLOOD PRESSURE: 123 MMHG | OXYGEN SATURATION: 98 % | TEMPERATURE: 99 F | DIASTOLIC BLOOD PRESSURE: 83 MMHG | RESPIRATION RATE: 20 BRPM

## 2023-10-24 DIAGNOSIS — Z97.5 NEXPLANON IN PLACE: ICD-10-CM

## 2023-10-24 DIAGNOSIS — R10.9 ABDOMINAL CRAMPING: Primary | ICD-10-CM

## 2023-10-24 LAB
ALBUMIN SERPL BCP-MCNC: 4.2 G/DL (ref 3.5–5.2)
ALP SERPL-CCNC: 102 U/L (ref 55–135)
ALT SERPL W/O P-5'-P-CCNC: 23 U/L (ref 10–44)
ANION GAP SERPL CALC-SCNC: 10 MMOL/L (ref 8–16)
AST SERPL-CCNC: 18 U/L (ref 10–40)
B-HCG UR QL: NEGATIVE
BASOPHILS # BLD AUTO: 0.04 K/UL (ref 0–0.2)
BASOPHILS NFR BLD: 0.4 % (ref 0–1.9)
BILIRUB SERPL-MCNC: 0.4 MG/DL (ref 0.1–1)
BILIRUB UR QL STRIP: NEGATIVE
BUN SERPL-MCNC: 5 MG/DL (ref 6–20)
CALCIUM SERPL-MCNC: 9.3 MG/DL (ref 8.7–10.5)
CHLORIDE SERPL-SCNC: 110 MMOL/L (ref 95–110)
CLARITY UR: CLEAR
CO2 SERPL-SCNC: 21 MMOL/L (ref 23–29)
COLOR UR: YELLOW
CREAT SERPL-MCNC: 0.7 MG/DL (ref 0.5–1.4)
DIFFERENTIAL METHOD: ABNORMAL
EOSINOPHIL # BLD AUTO: 0.2 K/UL (ref 0–0.5)
EOSINOPHIL NFR BLD: 1.8 % (ref 0–8)
ERYTHROCYTE [DISTWIDTH] IN BLOOD BY AUTOMATED COUNT: 11.9 % (ref 11.5–14.5)
EST. GFR  (NO RACE VARIABLE): >60 ML/MIN/1.73 M^2
GLUCOSE SERPL-MCNC: 96 MG/DL (ref 70–110)
GLUCOSE UR QL STRIP: NEGATIVE
HCG INTACT+B SERPL-ACNC: <1.2 MIU/ML
HCT VFR BLD AUTO: 41.3 % (ref 37–48.5)
HGB BLD-MCNC: 14.2 G/DL (ref 12–16)
HGB UR QL STRIP: NEGATIVE
IMM GRANULOCYTES # BLD AUTO: 0.02 K/UL (ref 0–0.04)
IMM GRANULOCYTES NFR BLD AUTO: 0.2 % (ref 0–0.5)
KETONES UR QL STRIP: NEGATIVE
LEUKOCYTE ESTERASE UR QL STRIP: ABNORMAL
LYMPHOCYTES # BLD AUTO: 2.6 K/UL (ref 1–4.8)
LYMPHOCYTES NFR BLD: 27.5 % (ref 18–48)
MCH RBC QN AUTO: 31 PG (ref 27–31)
MCHC RBC AUTO-ENTMCNC: 34.4 G/DL (ref 32–36)
MCV RBC AUTO: 90 FL (ref 82–98)
MICROSCOPIC COMMENT: NORMAL
MONOCYTES # BLD AUTO: 0.7 K/UL (ref 0.3–1)
MONOCYTES NFR BLD: 7.3 % (ref 4–15)
NEUTROPHILS # BLD AUTO: 5.9 K/UL (ref 1.8–7.7)
NEUTROPHILS NFR BLD: 62.8 % (ref 38–73)
NITRITE UR QL STRIP: NEGATIVE
NRBC BLD-RTO: 0 /100 WBC
PH UR STRIP: 8 [PH] (ref 5–8)
PLATELET # BLD AUTO: 317 K/UL (ref 150–450)
PMV BLD AUTO: 8 FL (ref 9.2–12.9)
POTASSIUM SERPL-SCNC: 4.2 MMOL/L (ref 3.5–5.1)
PROT SERPL-MCNC: 7.8 G/DL (ref 6–8.4)
PROT UR QL STRIP: NEGATIVE
RBC # BLD AUTO: 4.58 M/UL (ref 4–5.4)
SODIUM SERPL-SCNC: 141 MMOL/L (ref 136–145)
SP GR UR STRIP: 1.02 (ref 1–1.03)
SQUAMOUS #/AREA URNS HPF: 2 /HPF
URN SPEC COLLECT METH UR: ABNORMAL
UROBILINOGEN UR STRIP-ACNC: NEGATIVE EU/DL
WBC # BLD AUTO: 9.37 K/UL (ref 3.9–12.7)
WBC #/AREA URNS HPF: 5 /HPF (ref 0–5)

## 2023-10-24 PROCEDURE — 85025 COMPLETE CBC W/AUTO DIFF WBC: CPT

## 2023-10-24 PROCEDURE — 80053 COMPREHEN METABOLIC PANEL: CPT

## 2023-10-24 PROCEDURE — 81000 URINALYSIS NONAUTO W/SCOPE: CPT

## 2023-10-24 PROCEDURE — 84702 CHORIONIC GONADOTROPIN TEST: CPT

## 2023-10-24 PROCEDURE — 99283 EMERGENCY DEPT VISIT LOW MDM: CPT

## 2023-10-24 PROCEDURE — 81025 URINE PREGNANCY TEST: CPT

## 2023-10-24 PROCEDURE — 36415 COLL VENOUS BLD VENIPUNCTURE: CPT

## 2023-10-24 NOTE — ED TRIAGE NOTES
C/o nausea, vomiting, and lower abdominal pain x 4-5 days. Patient is concerned that she is pregnant. C/o urinary frequency.

## 2023-10-24 NOTE — Clinical Note
"Chari Schaefera" Marcel was seen and treated in our emergency department on 10/24/2023.  She may return to work on 10/26/2023.       If you have any questions or concerns, please don't hesitate to call.      Magdaleno Quinones, NP"

## 2023-10-24 NOTE — ED PROVIDER NOTES
Encounter Date: 10/24/2023       History     Chief Complaint   Patient presents with    Abdominal Pain     This note is dictated on M*Modal word recognition program.  There are word recognition mistakes and grammatical errors that are occasionally missed on review.     Chari Rod is a 18 y.o. female presents to ER today with complaints of lower mid abdominal cramping patient reports she feels like she could be pregnant however recently had a Nexplanon placed a month ago.  Patient also does report dysuria.  Patient denies any fevers at home.  Patient rates abdominal pain 4/10.  Patient denies taking Urine pregnancy test at home.      The history is provided by the patient.     Review of patient's allergies indicates:   Allergen Reactions    Iodine Swelling    Shellfish derived Swelling     Past Medical History:   Diagnosis Date    ADD (attention deficit disorder)     Allergy     seasonal    Anxiety     Eczema      Past Surgical History:   Procedure Laterality Date    ARTHROSCOPIC DEBRIDEMENT OF WRIST Right 4/26/2021    Procedure: ARTHROSCOPY, WRIST, WITH DEBRIDEMENT, right;  Surgeon: Vale Rich MD;  Location: [facility];  Service: Orthopedics;  Laterality: Right;  [facility] [place]    ARTHROSCOPY OF KNEE Right 6/27/2018    Procedure: ARTHROSCOPY, KNEE;  Surgeon: Adrian Shannon MD;  Location: [address];  Service: Orthopedics;  Laterality: Right;    COLONOSCOPY N/A 1/22/2019    Procedure: COLONOSCOPY;  Surgeon: Miko Parmar MD;  Location: [facility];  Service: Endoscopy;  Laterality: N/A;    ESOPHAGOGASTRODUODENOSCOPY N/A 1/22/2019    Procedure: ESOPHAGOGASTRODUODENOSCOPY (EGD);  Surgeon: Miko Parmar MD;  Location: [facility];  Service: Endoscopy;  Laterality: N/A;    FEMUR OSTEOTOMY Right 2/27/2020    Procedure: OSTEOTOMY, FEMUR (RIGHT) - correction of genu valgum.  Orthopediatrics distal femur plate.  MTF Thorpe wedges.;  Surgeon: Adrian Shannon MD;  Location: [address];  Service: Orthopedics;   Laterality: Right;  [facility] notified 2/21 [facility]    OSTEOTOMY AND ULNAR SHORTENING Right 9/17/2021    Procedure: OSTEOTOMY, ULNA, FOR SHORTENING,RIGHT;  Surgeon: Vale Rich MD;  Location: [facility];  Service: Orthopedics;  Laterality: Right;  [facility]     REPAIR OF MENISCUS OF KNEE Right 6/27/2018    Procedure: REPAIR, MENISCUS, KNEE-- medial;  Surgeon: Adrian Shannon MD;  Location: [address];  Service: Orthopedics;  Laterality: Right;    TONSILLECTOMY, ADENOIDECTOMY      TYMPANOSTOMY TUBE PLACEMENT       Family History   Problem Relation Age of Onset    ADD / ADHD Mother     Asthma Mother     Diabetes Mother     Eczema Mother     Thyroid disease Maternal Grandmother     Diabetes Maternal Grandfather     No Known Problems Father     No Known Problems Sister     No Known Problems Brother     No Known Problems Maternal Aunt     No Known Problems Maternal Uncle     No Known Problems Paternal Aunt     No Known Problems Paternal Uncle     No Known Problems Paternal Grandmother     No Known Problems Paternal Grandfather     Alcohol abuse Neg Hx     Allergies Neg Hx     Autism spectrum disorder Neg Hx     Behavior problems Neg Hx     Birth defects Neg Hx     Cancer Neg Hx     Chromosomal disorder Neg Hx     Cleft lip Neg Hx     Congenital heart disease Neg Hx     Depression Neg Hx     Early death Neg Hx     Hearing loss Neg Hx     Heart disease Neg Hx     Hyperlipidemia Neg Hx     Hypertension Neg Hx     Kidney disease Neg Hx     Learning disabilities Neg Hx     Mental illness Neg Hx     Migraines Neg Hx     Neurodegenerative disease Neg Hx     Obesity Neg Hx     Seizures Neg Hx     SIDS Neg Hx     Other Neg Hx      Social History     Tobacco Use    Smoking status: Never    Smokeless tobacco: Never   Substance Use Topics    Alcohol use: No    Drug use: No     Review of Systems   Constitutional: Negative.    HENT: Negative.     Eyes: Negative.    Gastrointestinal:  Positive for abdominal  pain.   Endocrine: Negative.    Genitourinary:  Positive for dysuria.   Musculoskeletal: Negative.    Skin: Negative.    Allergic/Immunologic: Negative.    Neurological: Negative.    Hematological: Negative.    Psychiatric/Behavioral: Negative.         Physical Exam     Initial Vitals [10/24/23 1036]   BP Pulse Resp Temp SpO2   123/83 109 20 98.8 °F (37.1 °C) 98 %      MAP       --         Physical Exam    Constitutional: She appears well-developed and well-nourished. She is not diaphoretic. No distress.   HENT:   Right Ear: External ear normal.   Left Ear: External ear normal.   Eyes: Pupils are equal, round, and reactive to light. Right eye exhibits no discharge. Left eye exhibits no discharge. No scleral icterus.   Neck: Neck supple.   Normal range of motion.  Cardiovascular:  Normal rate and regular rhythm.     Exam reveals no friction rub.       No murmur heard.  Pulmonary/Chest: Breath sounds normal. No respiratory distress. She has no wheezes.   Abdominal: Abdomen is soft. She exhibits no distension and no mass. There is no abdominal tenderness. There is no rebound and no guarding.   Musculoskeletal:         General: No tenderness or edema.      Cervical back: Normal range of motion and neck supple.     Neurological: She is alert and oriented to person, place, and time. GCS score is 15. GCS eye subscore is 4. GCS verbal subscore is 5. GCS motor subscore is 6.   Skin: Skin is warm. Capillary refill takes less than 2 seconds. No rash and no abscess noted. No erythema. No pallor.   Psychiatric: She has a normal mood and affect. Her behavior is normal. Judgment and thought content normal.         ED Course   Procedures  Labs Reviewed   CBC W/ AUTO DIFFERENTIAL - Abnormal; Notable for the following components:       Result Value    MPV 8.0 (*)     All other components within normal limits    Narrative:     Release to patient->Immediate   COMPREHENSIVE METABOLIC PANEL - Abnormal; Notable for the following  components:    CO2 21 (*)     BUN 5 (*)     All other components within normal limits    Narrative:     Release to patient->Immediate   URINALYSIS, REFLEX TO URINE CULTURE - Abnormal; Notable for the following components:    Leukocytes, UA 2+ (*)     All other components within normal limits    Narrative:     Specimen Source->Urine   PREGNANCY TEST, URINE RAPID    Narrative:     Specimen Source->Urine   HCG, QUANTITATIVE    Narrative:     Release to patient->Immediate   URINALYSIS MICROSCOPIC    Narrative:     Specimen Source->Urine          Imaging Results    None          Medications - No data to display  Medical Decision Making  Differential diagnosis include urinary tract infection, STD exposure, IUP, constipation    CBC and CMP revealed no acute metabolic derangement   Urinalysis negative for acute urinary tract infection.    Patient's urine pregnancy test negative, and hCG less than 1.2.  Patient has lower abdominal cramping most likely related to recent Nexplanon insertion.  UTI has been ruled out today.  Patient instructed to follow-up with OBGYN provider for further evaluation and treatment of lower abdominal cramping.    Vital signs hemodynamically stable.  Patient stable at time of discharge in no acute distress.  No life-threatening illnesses were found during ER visit today.  Patient was instructed to follow-up with PCP or other recommended specialist within the next 48-72 hours.  Patient was instructed to return to ER immediately for any worsening or concerning symptoms.  All discharge instructions discussed with patient, and patient agrees to comply with discharge instructions given today.     Amount and/or Complexity of Data Reviewed  Labs: ordered.                               Clinical Impression:   Final diagnoses:  [R10.9] Abdominal cramping (Primary)  [Z97.5] Nexplanon in place        ED Disposition Condition    Discharge Stable          ED Prescriptions    None       Follow-up Information     None          Magdaleno Quinones, NP  10/24/23 1151

## 2023-10-26 ENCOUNTER — PATIENT OUTREACH (OUTPATIENT)
Dept: EMERGENCY MEDICINE | Facility: HOSPITAL | Age: 18
End: 2023-10-26
Payer: MEDICAID

## 2023-10-26 NOTE — PROGRESS NOTES
Pt has to miss PCP appointment, due to work. Pt expressed she did request off but they did not give it to her. Pt would like to reschedule. Pt would also like to cancel the PCP appointment scheduled in [place]. Pt understands a message will be sent to Aneta Low, [facility]'s staff.    ED navigator sent the following message to Aneta Low, NP's staff: Good morning, pt would like to schedule a follow-up appointment. She is established and was seen at the beginning of 2023. If someone can please call her to schedule, it would be appreciated. Pt can be reached at [phone]. Thank you very much in advance! ED navigator cancelled pt's appointment with Sheri Matias MD. ED navigator ensured patient had no other needs at this time. ED navigator will follow-up with patient on/around 11/9/2023.    Milla Werner  ED Navigator- [place]  [phone]

## 2023-11-09 ENCOUNTER — PATIENT OUTREACH (OUTPATIENT)
Dept: EMERGENCY MEDICINE | Facility: HOSPITAL | Age: 18
End: 2023-11-09
Payer: MEDICAID

## 2023-11-09 NOTE — PROGRESS NOTES
Aylin Perez, Milla Maldonado, Patient Care Assistant  Caller: Unspecified (Today,  8:39 AM)  Unfortunately that is the soonest available appointment. I have added her to the wait list to be called if something sooner becomes available. Thank you

## 2023-11-09 NOTE — PROGRESS NOTES
Pt is doing well. Pt would like an earlier PCP appointment as expressed concerns of getting a referral to psychiatry. Pt has OBGYN appointment next week and would like to stay with this OBGYN, and not look for one closer to her area.    ED navigator sent the following to Aneta Low NP's staff: Good morning, I sent a message on 10/26 to schedule pt a follow-up appt. It looks like she was scheduled for 1/8/2024. Pt expressed concerns of getting a referral to psychiatry. Is there anyway we can get her in earlier for an appointment, for a referral? Thanks in advance! ED navigator ensured patient had no other needs at this time. ED navigator will follow-up with patient on/around 11/30/2023.    Milla Werner  ED Navigator- [place]  [phone]

## 2023-11-13 ENCOUNTER — TELEPHONE (OUTPATIENT)
Dept: OBSTETRICS AND GYNECOLOGY | Facility: CLINIC | Age: 18
End: 2023-11-13
Payer: MEDICAID

## 2023-11-13 NOTE — TELEPHONE ENCOUNTER
Called pt regarding appt tomorrow 11/14 for possible Nexplanon removal, informed pt that she just got device inserted a little over a month ago 10/3/23 after having device removed 7/11/23 pt stated the device hurts and is causing her not to eat stated she wanted the device out, informed her I'll send a message to the provider. Message sent to provider.

## 2023-11-13 NOTE — TELEPHONE ENCOUNTER
----- Message from Irma Grewal NP sent at 11/13/2023  1:44 PM CST -----  We can do a virtual visit to discuss first.  ----- Message -----  From: Veronika Treadwell MA  Sent: 11/13/2023  12:00 PM CST  To: Irma Grewal NP    Called this pt regarding her appt tomorrow for the nexplanon removal after having device for a little over a month confirmed pt identifiers, pt stated the device hurts and is causing her not to eat, stated she wants it out . Please advise. Thank you.

## 2023-11-14 ENCOUNTER — OFFICE VISIT (OUTPATIENT)
Dept: OBSTETRICS AND GYNECOLOGY | Facility: CLINIC | Age: 18
End: 2023-11-14
Payer: MEDICAID

## 2023-11-14 VITALS — BODY MASS INDEX: 34.54 KG/M2 | HEIGHT: 65 IN

## 2023-11-14 DIAGNOSIS — R11.2 NAUSEA AND VOMITING, UNSPECIFIED VOMITING TYPE: ICD-10-CM

## 2023-11-14 DIAGNOSIS — Z30.46 SURVEILLANCE OF IMPLANTABLE SUBDERMAL CONTRACEPTIVE: Primary | ICD-10-CM

## 2023-11-14 PROCEDURE — 99214 OFFICE O/P EST MOD 30 MIN: CPT | Mod: 95,,, | Performed by: NURSE PRACTITIONER

## 2023-11-14 PROCEDURE — 1159F MED LIST DOCD IN RCRD: CPT | Mod: CPTII,95,, | Performed by: NURSE PRACTITIONER

## 2023-11-14 PROCEDURE — 3008F PR BODY MASS INDEX (BMI) DOCUMENTED: ICD-10-PCS | Mod: CPTII,95,, | Performed by: NURSE PRACTITIONER

## 2023-11-14 PROCEDURE — 1160F PR REVIEW ALL MEDS BY PRESCRIBER/CLIN PHARMACIST DOCUMENTED: ICD-10-PCS | Mod: CPTII,95,, | Performed by: NURSE PRACTITIONER

## 2023-11-14 PROCEDURE — 1159F PR MEDICATION LIST DOCUMENTED IN MEDICAL RECORD: ICD-10-PCS | Mod: CPTII,95,, | Performed by: NURSE PRACTITIONER

## 2023-11-14 PROCEDURE — 3008F BODY MASS INDEX DOCD: CPT | Mod: CPTII,95,, | Performed by: NURSE PRACTITIONER

## 2023-11-14 PROCEDURE — 99214 PR OFFICE/OUTPT VISIT, EST, LEVL IV, 30-39 MIN: ICD-10-PCS | Mod: 95,,, | Performed by: NURSE PRACTITIONER

## 2023-11-14 PROCEDURE — 1160F RVW MEDS BY RX/DR IN RCRD: CPT | Mod: CPTII,95,, | Performed by: NURSE PRACTITIONER

## 2023-11-14 RX ORDER — ONDANSETRON 4 MG/1
4 TABLET, FILM COATED ORAL EVERY 6 HOURS
Qty: 12 TABLET | Refills: 1 | Status: SHIPPED | OUTPATIENT
Start: 2023-11-14
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
## 2023-11-30 ENCOUNTER — PATIENT OUTREACH (OUTPATIENT)
Dept: EMERGENCY MEDICINE | Facility: HOSPITAL | Age: 18
End: 2023-11-30
Payer: MEDICAID

## 2023-11-30 NOTE — PROGRESS NOTES
Pt expressed she is doing good. Pt identified that she was able to find psychiatrist on her own and has PCP appointments scheduled. Pt has no needs today.    ED navigator ensured there was nothing she could help patient with. ED navigator reminded patient to reach out if there is ever anything she can assist with. ED navigator will follow-up with patient on/around 1/5/2024.    Milla Werner  ED Navigator- [place]  [phone]

## 2023-12-19 ENCOUNTER — HOSPITAL ENCOUNTER (EMERGENCY)
Facility: HOSPITAL | Age: 18
Discharge: HOME | End: 2023-12-19
Attending: STUDENT IN AN ORGANIZED HEALTH CARE EDUCATION/TRAINING PROGRAM
Payer: MEDICAID

## 2023-12-19 VITALS
BODY MASS INDEX: 32.8 KG/M2 | TEMPERATURE: 99 F | HEART RATE: 117 BPM | SYSTOLIC BLOOD PRESSURE: 115 MMHG | OXYGEN SATURATION: 98 % | DIASTOLIC BLOOD PRESSURE: 76 MMHG | RESPIRATION RATE: 20 BRPM | HEIGHT: 65 IN | WEIGHT: 196.88 LBS

## 2023-12-19 DIAGNOSIS — J11.1 INFLUENZA: Primary | ICD-10-CM

## 2023-12-19 LAB
GROUP A STREP, MOLECULAR: NEGATIVE
INFLUENZA A, MOLECULAR: POSITIVE
INFLUENZA B, MOLECULAR: NEGATIVE
SARS-COV-2 RDRP RESP QL NAA+PROBE: NEGATIVE
SPECIMEN SOURCE: ABNORMAL

## 2023-12-19 PROCEDURE — U0002 COVID-19 LAB TEST NON-CDC: HCPCS | Performed by: STUDENT IN AN ORGANIZED HEALTH CARE EDUCATION/TRAINING PROGRAM

## 2023-12-19 PROCEDURE — 99284 EMERGENCY DEPT VISIT MOD MDM: CPT

## 2023-12-19 PROCEDURE — 87651 STREP A DNA AMP PROBE: CPT | Performed by: STUDENT IN AN ORGANIZED HEALTH CARE EDUCATION/TRAINING PROGRAM

## 2023-12-19 PROCEDURE — 87502 INFLUENZA DNA AMP PROBE: CPT | Performed by: STUDENT IN AN ORGANIZED HEALTH CARE EDUCATION/TRAINING PROGRAM

## 2023-12-19 RX ORDER — ONDANSETRON 4 MG/1
4 TABLET, FILM COATED ORAL EVERY 6 HOURS
Qty: 12 TABLET | Refills: 0 | Status: SHIPPED | OUTPATIENT
Start: 2023-12-19

## 2023-12-19 RX ORDER — OSELTAMIVIR PHOSPHATE 75 MG/1
75 CAPSULE ORAL 2 TIMES DAILY
Qty: 10 CAPSULE | Refills: 0 | Status: SHIPPED | OUTPATIENT
Start: 2023-12-19 | End: 2023-12-24

## 2023-12-19 NOTE — Clinical Note
"Chari Schaeferjoan Rod was seen and treated in our emergency department on 12/19/2023.  She may return to work on 12/25/2023.       If you have any questions or concerns, please don't hesitate to call.      Hermelinda Dorantes NP"

## 2023-12-19 NOTE — ED PROVIDER NOTES
Encounter Date: 12/19/2023       History     Chief Complaint   Patient presents with    General Illness     Patient to ER CC of coughing runny nose, congestion, body aches, fever for a few days      Complaint cough body aches vomiting  [age] year female in appearance with a history of ADHD anxiety and eczema presents to be evaluated for cough body aches chills and weakness for the last 24 hours She reports subjective fevers.  She reports productive cough with yellow sputum in a clear runny nose.  Denies shortness of breath or chest pain.  Reports 1 episode of posttussive vomiting.  Reports she has continued to have intake of fluids but has diminished appetite.  Denies any diarrhea or abdominal pain.  Unsure of any recent ill contacts      Review of patient's allergies indicates:   Allergen Reactions    Iodine Swelling    Shellfish derived Swelling     Past Medical History:   Diagnosis Date    ADD (attention deficit disorder)     Allergy     seasonal    Anxiety     Eczema      Past Surgical History:   Procedure Laterality Date    ARTHROSCOPIC DEBRIDEMENT OF WRIST Right 4/26/2021    Procedure: ARTHROSCOPY, WRIST, WITH DEBRIDEMENT, right;  Surgeon: Vale Rich MD;  Location: [facility];  Service: Orthopedics;  Laterality: Right;  [facility] [place]    ARTHROSCOPY OF KNEE Right 6/27/2018    Procedure: ARTHROSCOPY, KNEE;  Surgeon: Adrian Shannon MD;  Location: [address];  Service: Orthopedics;  Laterality: Right;    COLONOSCOPY N/A 1/22/2019    Procedure: COLONOSCOPY;  Surgeon: Mkio Parmar MD;  Location: [facility];  Service: Endoscopy;  Laterality: N/A;    ESOPHAGOGASTRODUODENOSCOPY N/A 1/22/2019    Procedure: ESOPHAGOGASTRODUODENOSCOPY (EGD);  Surgeon: Miko Parmar MD;  Location: [facility];  Service: Endoscopy;  Laterality: N/A;    FEMUR OSTEOTOMY Right 2/27/2020    Procedure: OSTEOTOMY, FEMUR (RIGHT) - correction of genu valgum.  Orthopediatrics distal femur plate.  MTF [place]pe wedges.;  Surgeon: Adrian Shannon  MD;  Location: [facility] OR 1ST FLR;  Service: Orthopedics;  Laterality: Right;  [facility] notified 2/21 [facility]    OSTEOTOMY AND ULNAR SHORTENING Right 9/17/2021    Procedure: OSTEOTOMY, ULNA, FOR SHORTENING,RIGHT;  Surgeon: Vale Rich MD;  Location: [facility];  Service: Orthopedics;  Laterality: Right;  [facility]     REPAIR OF MENISCUS OF KNEE Right 6/27/2018    Procedure: REPAIR, MENISCUS, KNEE-- medial;  Surgeon: Adrian Shannon MD;  Location: [facility] OR 2ND FLR;  Service: Orthopedics;  Laterality: Right;    TONSILLECTOMY, ADENOIDECTOMY      TYMPANOSTOMY TUBE PLACEMENT       Family History   Problem Relation Age of Onset    ADD / ADHD Mother     Asthma Mother     Diabetes Mother     Eczema Mother     Thyroid disease Maternal Grandmother     Diabetes Maternal Grandfather     No Known Problems Father     No Known Problems Sister     No Known Problems Brother     No Known Problems Maternal Aunt     No Known Problems Maternal Uncle     No Known Problems Paternal Aunt     No Known Problems Paternal Uncle     No Known Problems Paternal Grandmother     No Known Problems Paternal Grandfather     Alcohol abuse Neg Hx     Allergies Neg Hx     Autism spectrum disorder Neg Hx     Behavior problems Neg Hx     Birth defects Neg Hx     Cancer Neg Hx     Chromosomal disorder Neg Hx     Cleft lip Neg Hx     Congenital heart disease Neg Hx     Depression Neg Hx     Early death Neg Hx     Hearing loss Neg Hx     Heart disease Neg Hx     Hyperlipidemia Neg Hx     Hypertension Neg Hx     Kidney disease Neg Hx     Learning disabilities Neg Hx     Mental illness Neg Hx     Migraines Neg Hx     Neurodegenerative disease Neg Hx     Obesity Neg Hx     Seizures Neg Hx     SIDS Neg Hx     Other Neg Hx      Social History     Tobacco Use    Smoking status: Never    Smokeless tobacco: Never   Substance Use Topics    Alcohol use: No    Drug use: No     Review of Systems   Constitutional:  Positive for chills, fatigue and fever.    HENT:  Positive for congestion, rhinorrhea and sore throat.    Respiratory:  Positive for cough. Negative for shortness of breath.    Cardiovascular:  Negative for chest pain.   Gastrointestinal:  Positive for vomiting. Negative for abdominal pain and diarrhea.   Musculoskeletal:  Positive for myalgias.       Physical Exam     Initial Vitals [12/19/23 0755]   BP Pulse Resp Temp SpO2   129/83 (!) 121 18 99.2 °F (37.3 °C) 98 %      MAP       --         Physical Exam    Nursing note and vitals reviewed.  Constitutional: She appears well-developed and well-nourished.   HENT:   Head: Normocephalic and atraumatic.   Mouth/Throat: Oropharynx is clear and moist.   Cardiovascular:  Regular rhythm.     Exam reveals no gallop and no friction rub.       No murmur heard.  Pulmonary/Chest: Breath sounds normal. She has no wheezes. She has no rhonchi. She has no rales.   Musculoskeletal:         General: Normal range of motion.     Neurological: She is alert and oriented to person, place, and time.   Skin: Skin is warm and dry.         ED Course   Procedures  Labs Reviewed   INFLUENZA A & B BY MOLECULAR - Abnormal; Notable for the following components:       Result Value    Influenza A, Molecular Positive (*)     All other components within normal limits   GROUP A STREP, MOLECULAR   SARS-COV-2 RNA AMPLIFICATION, QUAL          Imaging Results    None          Medications - No data to display  Medical Decision Making  [age] year female presents to be evaluated for cough congestion body aches subjective fever presents to be evaluated.  Denies any shortness a breath or chest pain.  Unsure of any recent ill contacts   Differential diagnosis include COVID, flu, strep, viral URI, bronchitis, pneumonia    Risk  Prescription drug management.  Risk Details: Patient is well in appearance today   Vital signs stable  Afebrile    Physical exam exam unremarkable   Patient was swabbed in the ED for COVID, flu and strep and was Positive for  influenza  Will DC with Tamiflu supportive care and follow-up with PCP   Given return precautions                                         Clinical Impression:  Final diagnoses:  [J11.1] Influenza (Primary)          ED Disposition Condition    Discharge Stable          ED Prescriptions       Medication Sig Dispense Start Date End Date Auth. Provider    oseltamivir (TAMIFLU) 75 MG capsule Take 1 capsule (75 mg total) by mouth 2 (two) times daily. for 5 days 10 capsule 12/19/2023 12/24/2023 Hermelinda Dorantes NP    ondansetron (ZOFRAN) 4 MG tablet Take 1 tablet (4 mg total) by mouth every 6 (six) hours. 12 tablet 12/19/2023 -- Hermelinda Dorantes NP          Follow-up Information       Follow up With Specialties Details Why Contact Info    YOUR PRIMARY CARE PROVIDER  Schedule an appointment as soon as possible for a visit in 3 days               Hermelinda Dorantes NP  12/19/23 0205

## 2024-01-05 ENCOUNTER — PATIENT OUTREACH (OUTPATIENT)
Dept: EMERGENCY MEDICINE | Facility: HOSPITAL | Age: 19
End: 2024-01-05
Payer: MEDICAID

## 2024-01-05 NOTE — PROGRESS NOTES
Pt is unreachable. Encounter will be closed.    Milla Werner  ED Navigator- [place]  [phone]

## 2024-02-19 ENCOUNTER — TELEPHONE (OUTPATIENT)
Dept: PSYCHIATRY | Facility: CLINIC | Age: 19
End: 2024-02-19
Payer: MEDICAID

## 2024-02-19 NOTE — TELEPHONE ENCOUNTER
----- Message from Dalila Celaya sent at 2/19/2024 11:24 AM CST -----  Contact: Chari  Patient is calling trying to reach Dr. Zuleta states she seen this provider before and want to make appointment . Please callback  [phone]

## 2024-02-21 ENCOUNTER — HOSPITAL ENCOUNTER (EMERGENCY)
Facility: HOSPITAL | Age: 19
Discharge: HOME | End: 2024-02-21
Attending: STUDENT IN AN ORGANIZED HEALTH CARE EDUCATION/TRAINING PROGRAM
Payer: MEDICAID

## 2024-02-21 VITALS
TEMPERATURE: 98 F | BODY MASS INDEX: 32.8 KG/M2 | OXYGEN SATURATION: 98 % | SYSTOLIC BLOOD PRESSURE: 108 MMHG | HEART RATE: 89 BPM | HEIGHT: 65 IN | RESPIRATION RATE: 20 BRPM | WEIGHT: 196.88 LBS | DIASTOLIC BLOOD PRESSURE: 74 MMHG

## 2024-02-21 DIAGNOSIS — R07.9 CHEST PAIN: ICD-10-CM

## 2024-02-21 DIAGNOSIS — R07.9 ACUTE CHEST PAIN: ICD-10-CM

## 2024-02-21 DIAGNOSIS — R07.89 ATYPICAL CHEST PAIN: Primary | ICD-10-CM

## 2024-02-21 LAB
ALBUMIN SERPL BCP-MCNC: 4 G/DL (ref 3.5–5.2)
ALP SERPL-CCNC: 86 U/L (ref 55–135)
ALT SERPL W/O P-5'-P-CCNC: 18 U/L (ref 10–44)
AMPHET+METHAMPHET UR QL: NEGATIVE
ANION GAP SERPL CALC-SCNC: 9 MMOL/L (ref 8–16)
AST SERPL-CCNC: 14 U/L (ref 10–40)
B-HCG UR QL: NEGATIVE
BARBITURATES UR QL SCN>200 NG/ML: NEGATIVE
BASOPHILS # BLD AUTO: 0.03 K/UL (ref 0–0.2)
BASOPHILS NFR BLD: 0.3 % (ref 0–1.9)
BENZODIAZ UR QL SCN>200 NG/ML: NEGATIVE
BILIRUB SERPL-MCNC: 0.4 MG/DL (ref 0.1–1)
BILIRUB UR QL STRIP: NEGATIVE
BNP SERPL-MCNC: <10 PG/ML (ref 0–99)
BUN SERPL-MCNC: 10 MG/DL (ref 6–20)
BZE UR QL SCN: NEGATIVE
CALCIUM SERPL-MCNC: 9.1 MG/DL (ref 8.7–10.5)
CANNABINOIDS UR QL SCN: NEGATIVE
CHLORIDE SERPL-SCNC: 109 MMOL/L (ref 95–110)
CK SERPL-CCNC: 48 U/L (ref 20–180)
CLARITY UR: CLEAR
CO2 SERPL-SCNC: 23 MMOL/L (ref 23–29)
COLOR UR: YELLOW
CREAT SERPL-MCNC: 0.7 MG/DL (ref 0.5–1.4)
CREAT UR-MCNC: 247.2 MG/DL (ref 15–325)
D DIMER PPP IA.FEU-MCNC: 0.46 MG/L FEU
DIFFERENTIAL METHOD BLD: ABNORMAL
EOSINOPHIL # BLD AUTO: 0.1 K/UL (ref 0–0.5)
EOSINOPHIL NFR BLD: 1.4 % (ref 0–8)
ERYTHROCYTE [DISTWIDTH] IN BLOOD BY AUTOMATED COUNT: 11.9 % (ref 11.5–14.5)
EST. GFR  (NO RACE VARIABLE): >60 ML/MIN/1.73 M^2
GLUCOSE SERPL-MCNC: 78 MG/DL (ref 70–110)
GLUCOSE UR QL STRIP: NEGATIVE
HCT VFR BLD AUTO: 38.4 % (ref 37–48.5)
HGB BLD-MCNC: 13.6 G/DL (ref 12–16)
HGB UR QL STRIP: NEGATIVE
IMM GRANULOCYTES # BLD AUTO: 0.02 K/UL (ref 0–0.04)
IMM GRANULOCYTES NFR BLD AUTO: 0.2 % (ref 0–0.5)
KETONES UR QL STRIP: NEGATIVE
LEUKOCYTE ESTERASE UR QL STRIP: NEGATIVE
LYMPHOCYTES # BLD AUTO: 2.7 K/UL (ref 1–4.8)
LYMPHOCYTES NFR BLD: 30.4 % (ref 18–48)
MCH RBC QN AUTO: 31.4 PG (ref 27–31)
MCHC RBC AUTO-ENTMCNC: 35.4 G/DL (ref 32–36)
MCV RBC AUTO: 89 FL (ref 82–98)
METHADONE UR QL SCN>300 NG/ML: NEGATIVE
MONOCYTES # BLD AUTO: 0.7 K/UL (ref 0.3–1)
MONOCYTES NFR BLD: 7.9 % (ref 4–15)
NEUTROPHILS # BLD AUTO: 5.3 K/UL (ref 1.8–7.7)
NEUTROPHILS NFR BLD: 59.8 % (ref 38–73)
NITRITE UR QL STRIP: NEGATIVE
NRBC BLD-RTO: 0 /100 WBC
OPIATES UR QL SCN: NEGATIVE
PCP UR QL SCN>25 NG/ML: NEGATIVE
PH UR STRIP: 7 [PH] (ref 5–8)
PLATELET # BLD AUTO: 329 K/UL (ref 150–450)
PMV BLD AUTO: 8.2 FL (ref 9.2–12.9)
POTASSIUM SERPL-SCNC: 4.2 MMOL/L (ref 3.5–5.1)
PROT SERPL-MCNC: 7.1 G/DL (ref 6–8.4)
PROT UR QL STRIP: NEGATIVE
RBC # BLD AUTO: 4.33 M/UL (ref 4–5.4)
SODIUM SERPL-SCNC: 141 MMOL/L (ref 136–145)
SP GR UR STRIP: >=1.03 (ref 1–1.03)
TOXICOLOGY INFORMATION: NORMAL
TROPONIN I SERPL DL<=0.01 NG/ML-MCNC: <0.006 NG/ML (ref 0–0.03)
TSH SERPL DL<=0.005 MIU/L-ACNC: 0.93 UIU/ML (ref 0.4–4)
URN SPEC COLLECT METH UR: ABNORMAL
UROBILINOGEN UR STRIP-ACNC: 1 EU/DL
WBC # BLD AUTO: 8.84 K/UL (ref 3.9–12.7)

## 2024-02-21 PROCEDURE — 80307 DRUG TEST PRSMV CHEM ANLYZR: CPT | Performed by: NURSE PRACTITIONER

## 2024-02-21 PROCEDURE — 80053 COMPREHEN METABOLIC PANEL: CPT | Performed by: NURSE PRACTITIONER

## 2024-02-21 PROCEDURE — 81025 URINE PREGNANCY TEST: CPT | Performed by: NURSE PRACTITIONER

## 2024-02-21 PROCEDURE — 99285 EMERGENCY DEPT VISIT HI MDM: CPT | Mod: 25

## 2024-02-21 PROCEDURE — 63600175 PHARM REV CODE 636 W HCPCS: Performed by: NURSE PRACTITIONER

## 2024-02-21 PROCEDURE — 81003 URINALYSIS AUTO W/O SCOPE: CPT | Mod: 59 | Performed by: NURSE PRACTITIONER

## 2024-02-21 PROCEDURE — 83880 ASSAY OF NATRIURETIC PEPTIDE: CPT | Performed by: NURSE PRACTITIONER

## 2024-02-21 PROCEDURE — 85025 COMPLETE CBC W/AUTO DIFF WBC: CPT | Performed by: NURSE PRACTITIONER

## 2024-02-21 PROCEDURE — 84443 ASSAY THYROID STIM HORMONE: CPT | Performed by: NURSE PRACTITIONER

## 2024-02-21 PROCEDURE — 84484 ASSAY OF TROPONIN QUANT: CPT | Performed by: NURSE PRACTITIONER

## 2024-02-21 PROCEDURE — 93005 ELECTROCARDIOGRAM TRACING: CPT

## 2024-02-21 PROCEDURE — 93010 ELECTROCARDIOGRAM REPORT: CPT | Mod: ,,, | Performed by: INTERNAL MEDICINE

## 2024-02-21 PROCEDURE — 85379 FIBRIN DEGRADATION QUANT: CPT | Performed by: NURSE PRACTITIONER

## 2024-02-21 PROCEDURE — 82550 ASSAY OF CK (CPK): CPT | Performed by: NURSE PRACTITIONER

## 2024-02-21 PROCEDURE — 96372 THER/PROPH/DIAG INJ SC/IM: CPT | Performed by: NURSE PRACTITIONER

## 2024-02-21 RX ORDER — KETOROLAC TROMETHAMINE 10 MG/1
10 TABLET, FILM COATED ORAL EVERY 6 HOURS
Qty: 20 TABLET | Refills: 0 | Status: SHIPPED | OUTPATIENT
Start: 2024-02-21 | End: 2024-02-21

## 2024-02-21 RX ORDER — KETOROLAC TROMETHAMINE 10 MG/1
10 TABLET, FILM COATED ORAL EVERY 6 HOURS
Qty: 20 TABLET | Refills: 0 | Status: SHIPPED | OUTPATIENT
Start: 2024-02-21 | End: 2024-02-26

## 2024-02-21 RX ORDER — KETOROLAC TROMETHAMINE 30 MG/ML
30 INJECTION, SOLUTION INTRAMUSCULAR; INTRAVENOUS
Status: COMPLETED | OUTPATIENT
Start: 2024-02-21 | End: 2024-02-21

## 2024-02-21 RX ORDER — ESCITALOPRAM OXALATE 10 MG/1
10 TABLET ORAL DAILY
Qty: 15 TABLET | Refills: 0 | Status: SHIPPED | OUTPATIENT
Start: 2024-02-21 | End: 2024-04-08

## 2024-02-21 RX ADMIN — KETOROLAC TROMETHAMINE 30 MG: 30 INJECTION, SOLUTION INTRAMUSCULAR; INTRAVENOUS at 03:02

## 2024-02-21 NOTE — ED PROVIDER NOTES
Encounter Date: 2/21/2024       History     Chief Complaint   Patient presents with    Chest Pain     Chief complaint: Chest pain  18-year-old female with a history of ADD allergies anxiety and asthma presents to be evaluated for chest pain she has had for the last 3 days.  She reports it as constant midsternal sharp pain that radiates to her left shoulder.  Patient does have tenderness to the chest wall area to palpation.  She also reports some intermittent shortness of breath as well as some palpitations.  She reports a strong family cardiac history but no previous existing conditions or congenital cardiac conditions.  She denies the use of stimulants or caffeine.  She does take hormone contraceptions      Review of patient's allergies indicates:   Allergen Reactions    Iodine Swelling    Shellfish derived Swelling     Past Medical History:   Diagnosis Date    ADD (attention deficit disorder)     Allergy     seasonal    Anxiety     Eczema      Past Surgical History:   Procedure Laterality Date    ARTHROSCOPIC DEBRIDEMENT OF WRIST Right 4/26/2021    Procedure: ARTHROSCOPY, WRIST, WITH DEBRIDEMENT, right;  Surgeon: Vale Rich MD;  Location: [facility] OR;  Service: Orthopedics;  Laterality: Right;  [facility] [place]    ARTHROSCOPY OF KNEE Right 6/27/2018    Procedure: ARTHROSCOPY, KNEE;  Surgeon: Adrian Shannon MD;  [facility];  Service: Orthopedics;  Laterality: Right;    COLONOSCOPY N/A 1/22/2019    Procedure: COLONOSCOPY;  Surgeon: Miko Parmar MD;  Location: [facility];  Service: Endoscopy;  Laterality: N/A;    ESOPHAGOGASTRODUODENOSCOPY N/A 1/22/2019    Procedure: ESOPHAGOGASTRODUODENOSCOPY (EGD);  Surgeon: Miko Parmar MD;  Location: [facility];  Service: Endoscopy;  Laterality: N/A;    FEMUR OSTEOTOMY Right 2/27/2020    Procedure: OSTEOTOMY, FEMUR (RIGHT) - correction of genu valgum.  Orthopediatrics distal femur plate.  MTF Thorpe wedges.;  Surgeon: Adrian Shannon MD;  [facility];   Service: Orthopedics;  Laterality: Right;  [facility] notified 2/21 MAL    OSTEOTOMY AND ULNAR SHORTENING Right 9/17/2021    Procedure: OSTEOTOMY, ULNA, FOR SHORTENING,RIGHT;  Surgeon: Vale Rich MD;  Location: [facility];  Service: Orthopedics;  Laterality: Right;  MAC/REGIONAL     REPAIR OF MENISCUS OF KNEE Right 6/27/2018    Procedure: REPAIR, MENISCUS, KNEE-- medial;  Surgeon: Adrian Shannon MD;  Location: [address];  Service: Orthopedics;  Laterality: Right;    TONSILLECTOMY, ADENOIDECTOMY      TYMPANOSTOMY TUBE PLACEMENT       Family History   Problem Relation Age of Onset    ADD / ADHD Mother     Asthma Mother     Diabetes Mother     Eczema Mother     Thyroid disease Maternal Grandmother     Diabetes Maternal Grandfather     No Known Problems Father     No Known Problems Sister     No Known Problems Brother     No Known Problems Maternal Aunt     No Known Problems Maternal Uncle     No Known Problems Paternal Aunt     No Known Problems Paternal Uncle     No Known Problems Paternal Grandmother     No Known Problems Paternal Grandfather     Alcohol abuse Neg Hx     Allergies Neg Hx     Autism spectrum disorder Neg Hx     Behavior problems Neg Hx     Birth defects Neg Hx     Cancer Neg Hx     Chromosomal disorder Neg Hx     Cleft lip Neg Hx     Congenital heart disease Neg Hx     Depression Neg Hx     Early death Neg Hx     Hearing loss Neg Hx     Heart disease Neg Hx     Hyperlipidemia Neg Hx     Hypertension Neg Hx     Kidney disease Neg Hx     Learning disabilities Neg Hx     Mental illness Neg Hx     Migraines Neg Hx     Neurodegenerative disease Neg Hx     Obesity Neg Hx     Seizures Neg Hx     SIDS Neg Hx     Other Neg Hx      Social History     Tobacco Use    Smoking status: Never    Smokeless tobacco: Never   Substance Use Topics    Alcohol use: No    Drug use: No     Review of Systems   Constitutional:  Negative for fatigue and fever.   Respiratory:  Positive for chest  tightness and shortness of breath. Negative for wheezing and stridor.    Cardiovascular:  Positive for chest pain and palpitations. Negative for leg swelling.   Gastrointestinal:  Negative for abdominal pain, nausea and vomiting.   Musculoskeletal:  Negative for back pain.   Neurological:  Positive for weakness.       Physical Exam     Initial Vitals [02/21/24 1351]   BP Pulse Resp Temp SpO2   133/85 (!) 113 19 97.8 °F (36.6 °C) 98 %      MAP       --         Physical Exam    Nursing note and vitals reviewed.  Constitutional: She appears well-developed and well-nourished.   HENT:   Head: Normocephalic and atraumatic.   Eyes: EOM are normal. Pupils are equal, round, and reactive to light.   Cardiovascular:  Regular rhythm and normal heart sounds.     Exam reveals no gallop and no friction rub.       No murmur heard.  Pulmonary/Chest: Breath sounds normal. She has no wheezes. She has no rhonchi. She has no rales.   Musculoskeletal:         General: Tenderness present. Normal range of motion.     Neurological: She is alert and oriented to person, place, and time. She has normal strength.   Skin: Skin is warm and dry.   Psychiatric: She has a normal mood and affect. Thought content normal.         ED Course   Procedures  Labs Reviewed   CBC W/ AUTO DIFFERENTIAL - Abnormal; Notable for the following components:       Result Value    MCH 31.4 (*)     MPV 8.2 (*)     All other components within normal limits   URINALYSIS, REFLEX TO URINE CULTURE - Abnormal; Notable for the following components:    Specific Gravity, UA >=1.030 (*)     All other components within normal limits    Narrative:     Specimen Source->Urine   COMPREHENSIVE METABOLIC PANEL   B-TYPE NATRIURETIC PEPTIDE   D DIMER, QUANTITATIVE   DRUG SCREEN PANEL, URINE EMERGENCY    Narrative:     Specimen Source->Urine   TROPONIN I   TSH   PREGNANCY TEST, URINE RAPID    Narrative:     Specimen Source->Urine   CK     EKG Readings: (Independently Interpreted)   Initial  Reading: No STEMI. Previous EKG: Compared with most recent EKG Rhythm: Normal Sinus Rhythm. Heart Rate: 88. Ectopy: No Ectopy. Conduction: Normal.       Imaging Results              X-Ray Chest AP Portable (Final result)  Result time 02/21/24 14:51:57      Final result by Zulay Rod MD (02/21/24 14:51:57)                   Impression:      No acute abnormality.      Electronically signed by: Zulay Rod MD  Date:    02/21/2024  Time:    14:51               Narrative:    EXAMINATION:  XR CHEST AP PORTABLE    CLINICAL HISTORY:  Chest pain, unspecified    TECHNIQUE:  Single frontal view of the chest was performed.    COMPARISON:  08/30/2023    FINDINGS:  The lungs are clear with normal appearance of pulmonary vasculature. No pleural effusion. No evident pneumothorax.    The cardiac silhouette is normal in size. The hilar and mediastinal contours are unremarkable.    Bones are intact.                                       Medications   ketorolac injection 30 mg (30 mg Intramuscular Given 2/21/24 1513)     Medical Decision Making  18 year female presents for 3 day history of midsternal radiating constant chest pain with associated shortness of breath and palpitations.  Denies use of drugs stimulants or caffeine.  She does take hormone contraception  Differential diagnoses include ACS, tachycardia, SVT, anxiety, hormone imbalance    Amount and/or Complexity of Data Reviewed  Labs: ordered.     Details: CBC, CMP, troponin, CPK, D-dimer, BNP, TSH, UA  Radiology: ordered.     Details: Chest x-ray    Risk  Risk Details: Patient with midsternal chest pain that is reproducible to palpation.  No murmurs rubs or gallops on auscultation   Patient had extensive cardiac workup in ED with no concerning findings   Normal EKG   Patient states she may able to run something heavy and strained her chest wall   Patient given Toradol shot with some improvement   Stable for DC with follow-up with Cardiology given strict return  precautions                                      Clinical Impression:  Final diagnoses:  [R07.9] Acute chest pain  [R07.9] Chest pain  [R07.89] Atypical chest pain (Primary)          ED Disposition Condition    Discharge Stable          ED Prescriptions       Medication Sig Dispense Start Date End Date Auth. Provider    ketorolac (TORADOL) 10 mg tablet  (Status: Discontinued) Take 1 tablet (10 mg total) by mouth every 6 (six) hours. for 5 days 20 tablet 2/21/2024 2/21/2024 Hermelinda Dorantes NP    EScitalopram oxalate (LEXAPRO) 10 MG tablet Take 1 tablet (10 mg total) by mouth once daily. 15 tablet 2/21/2024 2/20/2025 Hermelinda Dorantes NP    ketorolac (TORADOL) 10 mg tablet Take 1 tablet (10 mg total) by mouth every 6 (six) hours. for 5 days 20 tablet 2/21/2024 2/26/2024 Hermelinda Dorantes NP          Follow-up Information    None          Hermelinda Dorantes NP  02/21/24 1534

## 2024-02-21 NOTE — DISCHARGE INSTRUCTIONS
Please follow-up with cardiology  If you develop any new worrisome symptoms please return to the emergency room

## 2024-02-21 NOTE — ED TRIAGE NOTES
18 y.o. female presents to ER Room/bed info not found   Chief Complaint   Patient presents with    Chest Pain   .   C/o chest pain for three days and feeling like HR is high

## 2024-02-21 NOTE — Clinical Note
"Chari Schaefera" Marcel was seen and treated in our emergency department on 2/21/2024.  She may return to work on 02/22/2024.       If you have any questions or concerns, please don't hesitate to call.      Jovon Sheikh, DO"

## 2024-02-22 LAB
OHS QRS DURATION: 74 MS
OHS QTC CALCULATION: 433 MS

## 2024-03-20 ENCOUNTER — PATIENT MESSAGE (OUTPATIENT)
Dept: PSYCHIATRY | Facility: CLINIC | Age: 19
End: 2024-03-20
Payer: MEDICAID

## 2024-03-26 ENCOUNTER — OFFICE VISIT (OUTPATIENT)
Dept: CARDIOLOGY | Facility: CLINIC | Age: 19
End: 2024-03-26
Payer: MEDICAID

## 2024-03-26 VITALS
RESPIRATION RATE: 18 BRPM | OXYGEN SATURATION: 98 % | SYSTOLIC BLOOD PRESSURE: 118 MMHG | BODY MASS INDEX: 33.13 KG/M2 | HEIGHT: 65 IN | HEART RATE: 88 BPM | DIASTOLIC BLOOD PRESSURE: 76 MMHG | WEIGHT: 198.88 LBS

## 2024-03-26 DIAGNOSIS — R07.9 CHEST PAIN, UNSPECIFIED TYPE: Primary | ICD-10-CM

## 2024-03-26 DIAGNOSIS — R07.89 ATYPICAL CHEST PAIN: ICD-10-CM

## 2024-03-26 DIAGNOSIS — R42 DIZZINESS: ICD-10-CM

## 2024-03-26 PROCEDURE — 3074F SYST BP LT 130 MM HG: CPT | Mod: CPTII,,, | Performed by: NURSE PRACTITIONER

## 2024-03-26 PROCEDURE — 99999 PR PBB SHADOW E&M-EST. PATIENT-LVL III: CPT | Mod: PBBFAC,,, | Performed by: NURSE PRACTITIONER

## 2024-03-26 PROCEDURE — 99204 OFFICE O/P NEW MOD 45 MIN: CPT | Mod: S$PBB,,, | Performed by: NURSE PRACTITIONER

## 2024-03-26 PROCEDURE — 99213 OFFICE O/P EST LOW 20 MIN: CPT | Mod: PBBFAC | Performed by: NURSE PRACTITIONER

## 2024-03-26 PROCEDURE — 3008F BODY MASS INDEX DOCD: CPT | Mod: CPTII,,, | Performed by: NURSE PRACTITIONER

## 2024-03-26 PROCEDURE — 3078F DIAST BP <80 MM HG: CPT | Mod: CPTII,,, | Performed by: NURSE PRACTITIONER

## 2024-03-26 NOTE — PROGRESS NOTES
"[facility]    CC: Chest pain    Patient ID: Chari Rod is a 18 y.o. female with a past medical history of ADD, anxiety    HPI  18-year-old female with a history of ADD, allergies, anxiety and asthma presents to be evaluated for chest pain she has had for the last 3 days.  She reports it as constant midsternal sharp pain that radiates to her left shoulder.  Patient does have tenderness to the chest wall area to palpation.  She also reports some intermittent shortness of breath as well as some palpitations.  She reports a strong family cardiac history but no previous existing conditions or congenital cardiac conditions.  She denies the use of stimulants or caffeine.  She does take hormone contraceptions    ED note  "Patient with midsternal chest pain that is reproducible to palpation.  No murmurs rubs or gallops on auscultation   Patient had extensive cardiac workup in ED with no concerning findings   Normal EKG   Patient states she possibly ran into something heavy and strained her chest wall   Patient given Toradol shot with some improvement   Stable for DC with follow-up with Cardiology given strict return precautions"    Here for follow up  Lipids stable     Past Medical History:   Diagnosis Date    ADD (attention deficit disorder)     Allergy     seasonal    Anxiety     Eczema      Past Surgical History:   Procedure Laterality Date    ARTHROSCOPIC DEBRIDEMENT OF WRIST Right 4/26/2021    Procedure: ARTHROSCOPY, WRIST, WITH DEBRIDEMENT, right;  Surgeon: Vale Rich MD;  Location: [facility];  Service: Orthopedics;  Laterality: Right;  [facility]    ARTHROSCOPY OF KNEE Right 6/27/2018    Procedure: ARTHROSCOPY, KNEE;  Surgeon: Adrian Shannon MD;  Location: [address];  Service: Orthopedics;  Laterality: Right;    COLONOSCOPY N/A 1/22/2019    Procedure: COLONOSCOPY;  Surgeon: Miko Parmar MD;  Location: [facility];  Service: Endoscopy;  Laterality: N/A;    " ESOPHAGOGASTRODUODENOSCOPY N/A 1/22/2019    Procedure: ESOPHAGOGASTRODUODENOSCOPY (EGD);  Surgeon: Miko Parmar MD;  Location: [facility];  Service: Endoscopy;  Laterality: N/A;    FEMUR OSTEOTOMY Right 2/27/2020    Procedure: OSTEOTOMY, FEMUR (RIGHT) - correction of genu valgum.  Orthopediatrics distal femur plate.  MTF Thorpe wedges.;  Surgeon: Adrian Shannon MD;  Location: [facility]ST FLR;  Service: Orthopedics;  Laterality: Right;  [facility] notified 2/21 MAL    OSTEOTOMY AND ULNAR SHORTENING Right 9/17/2021    Procedure: OSTEOTOMY, ULNA, FOR SHORTENING,RIGHT;  Surgeon: Vale Rich MD;  Location: [facility];  Service: Orthopedics;  Laterality: Right;  [facility]AL     REPAIR OF MENISCUS OF KNEE Right 6/27/2018    Procedure: REPAIR, MENISCUS, KNEE-- medial;  Surgeon: Adrian Shannon MD;  Location: [facility] OR 2ND FLR;  Service: Orthopedics;  Laterality: Right;    TONSILLECTOMY, ADENOIDECTOMY      TYMPANOSTOMY TUBE PLACEMENT       Social History     Socioeconomic History    Marital status: Single   Tobacco Use    Smoking status: Never    Smokeless tobacco: Never   Substance and Sexual Activity    Alcohol use: No    Drug use: No    Sexual activity: Yes     Partners: Male     Birth control/protection: OCP   Social History Narrative    Lives with mother.    2 dogs    2 ferrets     Social Determinants of Health     Financial Resource Strain: Low Risk  (10/12/2023)    Overall Financial Resource Strain (CARDIA)     Difficulty of Paying Living Expenses: Not hard at all   Food Insecurity: No Food Insecurity (10/12/2023)    Hunger Vital Sign     Worried About Running Out of Food in the Last Year: Never true     Ran Out of Food in the Last Year: Never true   Transportation Needs: No Transportation Needs (10/12/2023)    PRAPARE - Transportation     Lack of Transportation (Medical): No     Lack of Transportation (Non-Medical): No   Stress: Stress Concern Present (10/12/2023)    [employer] [place] [employer] - Occupational Stress Questionnaire     Feeling of Stress : Very much   Social Connections: Moderately Isolated (10/12/2023)    Social Connection and Isolation Panel [NHANES]     Frequency of Communication with Friends and Family: More than three times a week     Frequency of Social Gatherings with Friends and Family: More than three times a week     Attends Caodaism Services: Never     Active Member of Clubs or Organizations: No     Attends Club or Organization Meetings: Never     Marital Status: Living with partner   Housing Stability: Low Risk  (10/12/2023)    Housing Stability Vital Sign     Unable to Pay for Housing in the Last Year: No     Number of Places Lived in the Last Year: 2     Unstable Housing in the Last Year: No     Family History   Problem Relation Age of Onset    ADD / ADHD Mother     Asthma Mother     Diabetes Mother     Eczema Mother     Thyroid disease Maternal Grandmother     Diabetes Maternal Grandfather     No Known Problems Father     No Known Problems Sister     No Known Problems Brother     No Known Problems Maternal Aunt     No Known Problems Maternal Uncle     No Known Problems Paternal Aunt     No Known Problems Paternal Uncle     No Known Problems Paternal Grandmother     No Known Problems Paternal Grandfather     Alcohol abuse Neg Hx     Allergies Neg Hx     Autism spectrum disorder Neg Hx     Behavior problems Neg Hx     Birth defects Neg Hx     Cancer Neg Hx     Chromosomal disorder Neg Hx     Cleft lip Neg Hx     Congenital heart disease Neg Hx     Depression Neg Hx     Early death Neg Hx     Hearing loss Neg Hx     Heart disease Neg Hx     Hyperlipidemia Neg Hx     Hypertension Neg Hx     Kidney disease Neg Hx     Learning disabilities Neg Hx     Mental illness Neg Hx     Migraines Neg Hx     Neurodegenerative disease Neg Hx     Obesity Neg Hx     Seizures Neg Hx     SIDS Neg Hx     Other Neg Hx        Review of patient's allergies indicates:   Allergen Reactions     Iodine Swelling    Shellfish derived Swelling       Medication List with Changes/Refills   Current Medications    ESCITALOPRAM OXALATE (LEXAPRO) 10 MG TABLET    Take 1 tablet (10 mg total) by mouth once daily.    ONDANSETRON (ZOFRAN) 4 MG TABLET    Take 1 tablet (4 mg total) by mouth every 6 (six) hours.    ONDANSETRON (ZOFRAN) 4 MG TABLET    Take 1 tablet (4 mg total) by mouth every 6 (six) hours.    TOPIRAMATE (TOPAMAX) 25 MG TABLET    TAKE 1 TABLET BY MOUTH TWICE DAILY FOR 1 WEEK, 1 TABLET IN THE MORNING AND 2 TABLETS AT NIGHT FOR 1 WEEK, THEN 2 TABLETS TWICE DAILY           Review of Systems   Constitutional: Negative.   Cardiovascular:  Positive for chest pain.   Respiratory: Negative.     Skin: Negative.    Musculoskeletal: Negative.        There were no vitals filed for this visit.       Physical Exam  Cardiovascular:      Rate and Rhythm: Normal rate and regular rhythm.      Pulses: Normal pulses.      Heart sounds: Normal heart sounds.   Pulmonary:      Effort: Pulmonary effort is normal.      Breath sounds: Normal breath sounds.   Musculoskeletal:         General: Normal range of motion.   Skin:     General: Skin is warm and dry.   Neurological:      Mental Status: She is alert and oriented to person, place, and time.   Psychiatric:         Mood and Affect: Mood normal.           Labs:  Most Recent Data  CBC:   Lab Results   Component Value Date    WBC 8.84 02/21/2024    HGB 13.6 02/21/2024    HCT 38.4 02/21/2024     02/21/2024    MCV 89 02/21/2024    RDW 11.9 02/21/2024     BMP:   Lab Results   Component Value Date     02/21/2024    K 4.2 02/21/2024     02/21/2024    CO2 23 02/21/2024    BUN 10 02/21/2024    CREATININE 0.7 02/21/2024    GLU 78 02/21/2024    CALCIUM 9.1 02/21/2024     LFTS;   Lab Results   Component Value Date    PROT 7.1 02/21/2024    ALBUMIN 4.0 02/21/2024    BILITOT 0.4 02/21/2024    AST 14 02/21/2024    ALKPHOS 86 02/21/2024    ALT 18 02/21/2024     COAGS:   Lab  Results   Component Value Date    INR 1.1 01/15/2019     FLP:   Lab Results   Component Value Date    CHOL 139 03/16/2023    HDL 25 (L) 03/16/2023    LDLCALC 97.8 03/16/2023    TRIG 81 03/16/2023    CHOLHDL 18.0 (L) 03/16/2023     CARDIAC:   Lab Results   Component Value Date    TROPONINI <0.006 02/21/2024    BNP <10 02/21/2024       Assessment/Plan:  Problem List Items Addressed This Visit    None  Visit Diagnoses       Atypical chest pain              Likely muscular in nature    Ok to check exercise stress for chest pain     Follow up PRN     Total duration of face to face visit time 30 minutes.  Total time spent counseling greater than fifty percent of total visit time.  Counseling included discussion regarding imaging findings, diagnosis, possibilities, treatment options, risks and benefits.  The patient had many questions regarding the options and long-term effects.    JEMAL Cadet-SHO  Cardiology Clinic  [facility]

## 2024-04-08 ENCOUNTER — OFFICE VISIT (OUTPATIENT)
Dept: PSYCHIATRY | Facility: CLINIC | Age: 19
End: 2024-04-08
Payer: MEDICAID

## 2024-04-08 DIAGNOSIS — F41.9 ANXIETY DISORDER, UNSPECIFIED TYPE: ICD-10-CM

## 2024-04-08 DIAGNOSIS — F33.1 DEPRESSION, MAJOR, RECURRENT, MODERATE: Primary | ICD-10-CM

## 2024-04-08 DIAGNOSIS — Z91.52 HISTORY OF SELF MUTILATION: ICD-10-CM

## 2024-04-08 DIAGNOSIS — L30.9 ECZEMA, UNSPECIFIED TYPE: ICD-10-CM

## 2024-04-08 DIAGNOSIS — G43.809 OTHER MIGRAINE WITHOUT STATUS MIGRAINOSUS, NOT INTRACTABLE: ICD-10-CM

## 2024-04-08 DIAGNOSIS — Z63.9 FAMILY DYNAMICS PROBLEM: ICD-10-CM

## 2024-04-08 PROCEDURE — 99215 OFFICE O/P EST HI 40 MIN: CPT | Mod: AF,HA,95, | Performed by: PSYCHIATRY & NEUROLOGY

## 2024-04-08 RX ORDER — ESCITALOPRAM OXALATE 10 MG/1
15 TABLET ORAL DAILY
Qty: 135 TABLET | Refills: 1 | Status: SHIPPED | OUTPATIENT
Start: 2024-04-08 | End: 2024-10-05

## 2024-04-08 SDOH — SOCIAL DETERMINANTS OF HEALTH (SDOH): PROBLEM RELATED TO PRIMARY SUPPORT GROUP, UNSPECIFIED: Z63.9

## 2024-04-08 NOTE — PATIENT INSTRUCTIONS
PLAN:     Follow up June 26 2024 @ 3 pm / in Clinic     Meds: Lexapro  (15 mg)  once daily  (via 1 and 1/2 tab of 10 mg tab) #90 x1 refill     Risk benefit discussed incl preg risks / pt assures not pregnant    References:     Relaxation stress reduction workbook: SANTOSH Jiang PhD ( used: $7-10)    Feeling Good Website: Joe Yang MD / www.CatchTheEye website (free) / [email or web address]. PODCASTS    Anxiety &  phobia workbook by ALLEN Anand PhD  (web retailers: used: $ 7-10)    [place]: Path to Better Sleep : https://www.veterantraining.[place].gov/insomnia/ (free)     Pt expressed appreciation for the visit today and did not have further question at this time though pt  was still informed to:     Call  if problems.    Call / Report Side Effects to Psyc MD     Encouraged to follow up with primary care / Gen Med MD for continued monitoring of general health and wellness.    Understanding was expressed; and no further concerns nor questions were raised at this time.     Remember healthy self care:   eat right  attempt adequate rest   HANDWASHING / encourage such jabari. During this corona virus time   walk or light exercise within reason and as your general med team approves  read or explore any of reference materials / homework mentioned  reach out (I.e.,  connect with)  others who nuture and bring out best in you  avoid risky behaviors    Keep your appointments:    IF you  cannot make your appt THEN please call [phone] or go online (via My Chart gerry) to reschedule.    It is the responsibility of the patient to reschedule an appointment if an appointment has been canceled or missed.    Avoid  alcohol and illicit substances.  Look for the positive.  All is often relative-seek balance  Call sooner if needed : [phone]   Call 911 or go to Emergency Room  (ER)  if  any acute concerns

## 2024-04-11 ENCOUNTER — HOSPITAL ENCOUNTER (EMERGENCY)
Facility: HOSPITAL | Age: 19
Discharge: HOME | End: 2024-04-11
Attending: STUDENT IN AN ORGANIZED HEALTH CARE EDUCATION/TRAINING PROGRAM
Payer: MEDICAID

## 2024-04-11 VITALS
TEMPERATURE: 98 F | WEIGHT: 195.44 LBS | OXYGEN SATURATION: 99 % | DIASTOLIC BLOOD PRESSURE: 84 MMHG | RESPIRATION RATE: 18 BRPM | BODY MASS INDEX: 32.56 KG/M2 | HEART RATE: 84 BPM | HEIGHT: 65 IN | SYSTOLIC BLOOD PRESSURE: 129 MMHG

## 2024-04-11 DIAGNOSIS — K52.9 GASTROENTERITIS: ICD-10-CM

## 2024-04-11 DIAGNOSIS — R10.30 LOWER ABDOMINAL PAIN: Primary | ICD-10-CM

## 2024-04-11 LAB
ALBUMIN SERPL BCP-MCNC: 4.1 G/DL (ref 3.5–5.2)
ALP SERPL-CCNC: 94 U/L (ref 55–135)
ALT SERPL W/O P-5'-P-CCNC: 17 U/L (ref 10–44)
ANION GAP SERPL CALC-SCNC: 7 MMOL/L (ref 8–16)
AST SERPL-CCNC: 15 U/L (ref 10–40)
B-HCG UR QL: NEGATIVE
BASOPHILS # BLD AUTO: 0.03 K/UL (ref 0–0.2)
BASOPHILS NFR BLD: 0.4 % (ref 0–1.9)
BILIRUB SERPL-MCNC: 0.5 MG/DL (ref 0.1–1)
BILIRUB UR QL STRIP: NEGATIVE
BUN SERPL-MCNC: 7 MG/DL (ref 6–20)
CALCIUM SERPL-MCNC: 9.3 MG/DL (ref 8.7–10.5)
CHLORIDE SERPL-SCNC: 109 MMOL/L (ref 95–110)
CLARITY UR: CLEAR
CO2 SERPL-SCNC: 25 MMOL/L (ref 23–29)
COLOR UR: YELLOW
CREAT SERPL-MCNC: 0.8 MG/DL (ref 0.5–1.4)
DIFFERENTIAL METHOD BLD: ABNORMAL
EOSINOPHIL # BLD AUTO: 0.1 K/UL (ref 0–0.5)
EOSINOPHIL NFR BLD: 1.7 % (ref 0–8)
ERYTHROCYTE [DISTWIDTH] IN BLOOD BY AUTOMATED COUNT: 12 % (ref 11.5–14.5)
EST. GFR  (NO RACE VARIABLE): >60 ML/MIN/1.73 M^2
GLUCOSE SERPL-MCNC: 81 MG/DL (ref 70–110)
GLUCOSE UR QL STRIP: NEGATIVE
HCT VFR BLD AUTO: 42.5 % (ref 37–48.5)
HGB BLD-MCNC: 14.3 G/DL (ref 12–16)
HGB UR QL STRIP: NEGATIVE
IMM GRANULOCYTES # BLD AUTO: 0.03 K/UL (ref 0–0.04)
IMM GRANULOCYTES NFR BLD AUTO: 0.4 % (ref 0–0.5)
KETONES UR QL STRIP: NEGATIVE
LACTATE SERPL-SCNC: 0.9 MMOL/L (ref 0.5–2.2)
LEUKOCYTE ESTERASE UR QL STRIP: NEGATIVE
LIPASE SERPL-CCNC: 25 U/L (ref 4–60)
LYMPHOCYTES # BLD AUTO: 2.3 K/UL (ref 1–4.8)
LYMPHOCYTES NFR BLD: 27.6 % (ref 18–48)
MCH RBC QN AUTO: 31.6 PG (ref 27–31)
MCHC RBC AUTO-ENTMCNC: 33.6 G/DL (ref 32–36)
MCV RBC AUTO: 94 FL (ref 82–98)
MONOCYTES # BLD AUTO: 0.7 K/UL (ref 0.3–1)
MONOCYTES NFR BLD: 9 % (ref 4–15)
NEUTROPHILS # BLD AUTO: 5 K/UL (ref 1.8–7.7)
NEUTROPHILS NFR BLD: 60.9 % (ref 38–73)
NITRITE UR QL STRIP: NEGATIVE
NRBC BLD-RTO: 0 /100 WBC
PH UR STRIP: 7 [PH] (ref 5–8)
PLATELET # BLD AUTO: 326 K/UL (ref 150–450)
PMV BLD AUTO: 8.3 FL (ref 9.2–12.9)
POTASSIUM SERPL-SCNC: 4.1 MMOL/L (ref 3.5–5.1)
PROT SERPL-MCNC: 7.5 G/DL (ref 6–8.4)
PROT UR QL STRIP: ABNORMAL
RBC # BLD AUTO: 4.53 M/UL (ref 4–5.4)
SODIUM SERPL-SCNC: 141 MMOL/L (ref 136–145)
SP GR UR STRIP: 1.01 (ref 1–1.03)
URN SPEC COLLECT METH UR: ABNORMAL
UROBILINOGEN UR STRIP-ACNC: 1 EU/DL
WBC # BLD AUTO: 8.14 K/UL (ref 3.9–12.7)

## 2024-04-11 PROCEDURE — 81003 URINALYSIS AUTO W/O SCOPE: CPT | Performed by: STUDENT IN AN ORGANIZED HEALTH CARE EDUCATION/TRAINING PROGRAM

## 2024-04-11 PROCEDURE — 83605 ASSAY OF LACTIC ACID: CPT | Performed by: STUDENT IN AN ORGANIZED HEALTH CARE EDUCATION/TRAINING PROGRAM

## 2024-04-11 PROCEDURE — 83690 ASSAY OF LIPASE: CPT | Performed by: STUDENT IN AN ORGANIZED HEALTH CARE EDUCATION/TRAINING PROGRAM

## 2024-04-11 PROCEDURE — 85025 COMPLETE CBC W/AUTO DIFF WBC: CPT | Performed by: STUDENT IN AN ORGANIZED HEALTH CARE EDUCATION/TRAINING PROGRAM

## 2024-04-11 PROCEDURE — 87491 CHLMYD TRACH DNA AMP PROBE: CPT | Performed by: STUDENT IN AN ORGANIZED HEALTH CARE EDUCATION/TRAINING PROGRAM

## 2024-04-11 PROCEDURE — 81025 URINE PREGNANCY TEST: CPT | Performed by: STUDENT IN AN ORGANIZED HEALTH CARE EDUCATION/TRAINING PROGRAM

## 2024-04-11 PROCEDURE — 99284 EMERGENCY DEPT VISIT MOD MDM: CPT | Mod: 25

## 2024-04-11 PROCEDURE — 80053 COMPREHEN METABOLIC PANEL: CPT | Performed by: STUDENT IN AN ORGANIZED HEALTH CARE EDUCATION/TRAINING PROGRAM

## 2024-04-11 PROCEDURE — 63600175 PHARM REV CODE 636 W HCPCS: Performed by: STUDENT IN AN ORGANIZED HEALTH CARE EDUCATION/TRAINING PROGRAM

## 2024-04-11 PROCEDURE — 96374 THER/PROPH/DIAG INJ IV PUSH: CPT

## 2024-04-11 PROCEDURE — 25000003 PHARM REV CODE 250: Performed by: STUDENT IN AN ORGANIZED HEALTH CARE EDUCATION/TRAINING PROGRAM

## 2024-04-11 PROCEDURE — 96361 HYDRATE IV INFUSION ADD-ON: CPT

## 2024-04-11 RX ORDER — ONDANSETRON 4 MG/1
4 TABLET, FILM COATED ORAL EVERY 6 HOURS
Qty: 12 TABLET | Refills: 0 | Status: SHIPPED | OUTPATIENT
Start: 2024-04-11

## 2024-04-11 RX ORDER — ONDANSETRON HYDROCHLORIDE 2 MG/ML
4 INJECTION, SOLUTION INTRAVENOUS
Status: COMPLETED | OUTPATIENT
Start: 2024-04-11 | End: 2024-04-11

## 2024-04-11 RX ORDER — DICYCLOMINE HYDROCHLORIDE 20 MG/1
20 TABLET ORAL 2 TIMES DAILY
Qty: 20 TABLET | Refills: 0 | OUTPATIENT
Start: 2024-04-11 | End: 2024-05-07

## 2024-04-11 RX ADMIN — ONDANSETRON 4 MG: 2 INJECTION INTRAMUSCULAR; INTRAVENOUS at 01:04

## 2024-04-11 RX ADMIN — SODIUM CHLORIDE 1000 ML: 9 INJECTION, SOLUTION INTRAVENOUS at 01:04

## 2024-04-11 NOTE — ED TRIAGE NOTES
PATIENT TO THE ED C/O OF ABDOMINAL PAIN 6/10 REPORTING HER STOMACH FEELS LIKE ITS IN KNOTS AND STABBING IN BILATERAL LOWER QUADS. PATIENT NAUSEA AND VOMITING FOR 2 DAYS.

## 2024-04-11 NOTE — DISCHARGE INSTRUCTIONS
Please follow up with your primary care physician within 2 days. Ensure that you review all lab work results and/or imaging results. If you have any questions about your discharge paperwork please call the Emergency Department.     Return to the ED for any fevers, nausea, vomiting, abdominal pain, diarrhea, inability to take food or water by mouth without vomiting, or any new or worsening symptoms.       If you were prescribed antibiotics, please take them to completion. If you were prescribed a narcotic or any sedating medication, do not drive or operate heavy equipment or machinery while taking these medications.  If you were diagnosed with a seizure, syncope, any loss of consciousness or decreased alertness, do not drive, swim, operate heavy machinery, or put yourself in any position where a sudden loss of consciousness could put yourself or others in danger.    Thank you for visiting [facility], Department of Emergency Medicine. Please see the entirety of the educational materials provided. Please note that a visit to the emergency department does not substitute ongoing care from a primary medical provider or specialist. Please ensure to follow up as recommended. However, please return to the emergency department immediately if symptoms do not improve as discussed, symptoms worsen, new symptoms develop, difficulty in following up or for any of your concerns or issues. Please note on discharge you are acknowledging understanding and agreement on medical evaluation, management recommendations and follow up recommendations.

## 2024-04-11 NOTE — ED PROVIDER NOTES
Encounter Date: 4/11/2024       History     Chief Complaint   Patient presents with    Abdominal Pain     PATIENT TO THE ED C/O OF ABDOMINAL PAIN 6/10 REPORTING HER STOMACH FEELS LIKE ITS IN KNOTS AND STABBING IN BILATERAL LOWER QUADS. PATIENT NAUSEA AND VOMITING FOR 2 DAYS.      18-year-old female with history of anxiety presenting with lower abdominal pain for the last two days.  Patient reports that her and her boyfriend recently suffered from a gastrointestinal illness, and she believes that hers has persisted for longer.  Reports nausea and vomiting, but no diarrhea.  No fever.  She reports having regular sex with a boyfriend, however noted that yesterday her vagina was sore and swollen.  Denies any discharge.  No dysuria or back pain.  No vaginal bleeding      Review of patient's allergies indicates:   Allergen Reactions    Iodine Swelling    Shellfish derived Swelling     Past Medical History:   Diagnosis Date    ADD (attention deficit disorder)     Allergy     seasonal    Anxiety     Eczema      Past Surgical History:   Procedure Laterality Date    ARTHROSCOPIC DEBRIDEMENT OF WRIST Right 4/26/2021    Procedure: ARTHROSCOPY, WRIST, WITH DEBRIDEMENT, right;  Surgeon: Vale Rich MD;  Location: [facility];  Service: Orthopedics;  Laterality: Right;  [facility] [place]    ARTHROSCOPY OF KNEE Right 6/27/2018    Procedure: ARTHROSCOPY, KNEE;  Surgeon: Adrian Shannon MD;  Location: [address];  Service: Orthopedics;  Laterality: Right;    COLONOSCOPY N/A 1/22/2019    Procedure: COLONOSCOPY;  Surgeon: Miko Parmar MD;  Location: [facility];  Service: Endoscopy;  Laterality: N/A;    ESOPHAGOGASTRODUODENOSCOPY N/A 1/22/2019    Procedure: ESOPHAGOGASTRODUODENOSCOPY (EGD);  Surgeon: Miko Parmar MD;  Location: [facility];  Service: Endoscopy;  Laterality: N/A;    FEMUR OSTEOTOMY Right 2/27/2020    Procedure: OSTEOTOMY, FEMUR (RIGHT) - correction of genu valgum.  Orthopediatrics distal femur plate.  MTF Thorpe wedges.;   Surgeon: Adrian Shannon MD;  Location: [facility] OR 1ST FLR;  Service: Orthopedics;  Laterality: Right;  [facility] notified 2/21 MAL    OSTEOTOMY AND ULNAR SHORTENING Right 9/17/2021    Procedure: OSTEOTOMY, ULNA, FOR SHORTENING,RIGHT;  Surgeon: Vale Rich MD;  Location: [facility];  Service: Orthopedics;  Laterality: Right;  [facility]     REPAIR OF MENISCUS OF KNEE Right 6/27/2018    Procedure: REPAIR, MENISCUS, KNEE-- medial;  Surgeon: Adrian Shannon MD;  Location: [facility] OR 2ND FLR;  Service: Orthopedics;  Laterality: Right;    TONSILLECTOMY, ADENOIDECTOMY      TYMPANOSTOMY TUBE PLACEMENT       Family History   Problem Relation Age of Onset    ADD / ADHD Mother     Asthma Mother     Diabetes Mother     Eczema Mother     Thyroid disease Maternal Grandmother     Diabetes Maternal Grandfather     No Known Problems Father     No Known Problems Sister     No Known Problems Brother     No Known Problems Maternal Aunt     No Known Problems Maternal Uncle     No Known Problems Paternal Aunt     No Known Problems Paternal Uncle     No Known Problems Paternal Grandmother     No Known Problems Paternal Grandfather     Alcohol abuse Neg Hx     Allergies Neg Hx     Autism spectrum disorder Neg Hx     Behavior problems Neg Hx     Birth defects Neg Hx     Cancer Neg Hx     Chromosomal disorder Neg Hx     Cleft lip Neg Hx     Congenital heart disease Neg Hx     Depression Neg Hx     Early death Neg Hx     Hearing loss Neg Hx     Heart disease Neg Hx     Hyperlipidemia Neg Hx     Hypertension Neg Hx     Kidney disease Neg Hx     Learning disabilities Neg Hx     Mental illness Neg Hx     Migraines Neg Hx     Neurodegenerative disease Neg Hx     Obesity Neg Hx     Seizures Neg Hx     SIDS Neg Hx     Other Neg Hx      Social History     Tobacco Use    Smoking status: Never    Smokeless tobacco: Never   Substance Use Topics    Alcohol use: No    Drug use: No     Review of Systems   Constitutional:  Negative for  fever.   HENT:  Negative for sore throat.    Respiratory:  Negative for shortness of breath.    Cardiovascular:  Negative for chest pain.   Gastrointestinal:  Positive for abdominal pain, nausea and vomiting. Negative for diarrhea.   Genitourinary:  Negative for dysuria, vaginal bleeding and vaginal discharge.   Musculoskeletal:  Negative for back pain.   Skin:  Negative for rash.   Neurological:  Negative for weakness.   Hematological:  Does not bruise/bleed easily.       Physical Exam     Initial Vitals [04/11/24 1309]   BP Pulse Resp Temp SpO2   114/77 (!) 120 18 98.3 °F (36.8 °C) 97 %      MAP       --         Physical Exam    Nursing note and vitals reviewed.  Constitutional: She appears well-developed.   HENT:   Head: Normocephalic.   Eyes: Pupils are equal, round, and reactive to light.   Neck:   Normal range of motion.  Cardiovascular:            No murmur heard.  Pulmonary/Chest: No respiratory distress.   Abdominal: Abdomen is soft.   No TTP diffusely. No guarding, rebound, or masses. Negative Nair's sign. No TTP at McBurney's point. No CVAT bilaterally.     Musculoskeletal:         General: No edema.      Cervical back: Normal range of motion.     Neurological: She is alert.   Skin: Skin is warm.   Psychiatric: She has a normal mood and affect.         ED Course   Procedures  Labs Reviewed   URINALYSIS, REFLEX TO URINE CULTURE - Abnormal; Notable for the following components:       Result Value    Protein, UA Trace (*)     All other components within normal limits    Narrative:     Specimen Source->Urine   CBC W/ AUTO DIFFERENTIAL - Abnormal; Notable for the following components:    MCH 31.6 (*)     MPV 8.3 (*)     All other components within normal limits   COMPREHENSIVE METABOLIC PANEL - Abnormal; Notable for the following components:    Anion Gap 7 (*)     All other components within normal limits   PREGNANCY TEST, URINE RAPID    Narrative:     Specimen Source->Urine   LIPASE   LACTIC ACID, PLASMA    C. TRACHOMATIS/N. GONORRHOEAE BY AMP DNA          Imaging Results    None          Medications   sodium chloride 0.9% bolus 1,000 mL 1,000 mL (0 mLs Intravenous Stopped 4/11/24 1450)   ondansetron injection 4 mg (4 mg Intravenous Given 4/11/24 1349)     Medical Decision Making  DDX: Unlikely acute abdominal pathology such as appendicitis/cholecystitis given benign abdomen, negative Nair's sign at this time. R/o pancreatitis, UTI. Possible GERD/gastritis vs. AGE given history, benign abdomen.  DX: BMP, CBC, LFT, lipase, UA. Upreg. Serial abdominal exams. Consider CT A/P if change in abdominal exam to assess for early appendicitis. Consider ultrasound if change in abdominal exam to assess for cholecystitis.  TX: Analgesia PRN. Antiemetic PRN. IVF. Treatment/consult as indicated by studies.  Dispo: Pending studies. If studies WNL, symptoms controlled, tolerating PO, discharge to follow up with primary doctor within 2 days with recommendations for supportive care at home and strict precautions for return.        Amount and/or Complexity of Data Reviewed  Labs: ordered.    Risk  Prescription drug management.               ED Course as of 04/12/24 0708   Thu Apr 11, 2024   1329 Vaginal exam is unremarkable.  No swelling, erythema, induration, fluctuance.  Exam chaperoned by clay Leos [NB]   1331 Patient denies a contrast allergy. [NB]      ED Course User Index  [NB] Darron Cherry MD                           Clinical Impression:  Final diagnoses:  [R10.30] Lower abdominal pain (Primary)  [K52.9] Gastroenteritis          ED Disposition Condition    Discharge Stable          ED Prescriptions       Medication Sig Dispense Start Date End Date Auth. Provider    dicyclomine (BENTYL) 20 mg tablet Take 1 tablet (20 mg total) by mouth 2 (two) times daily. 20 tablet 4/11/2024 5/11/2024 Darron Cherry MD    ondansetron (ZOFRAN) 4 MG tablet Take 1 tablet (4 mg total) by mouth every 6 (six) hours. 12 tablet  4/11/2024 -- Darron Cherry MD          Follow-up Information       Follow up With Specialties Details Why Contact Info    Aneta Low NP Family Medicine Schedule an appointment as soon as possible for a visit in 2 days  [address] [facility] [address]  [phone]      [facility] - Emergency Dept Emergency Medicine  If symptoms worsen [address] [facility] [address]  [phone]             Darron Cherry MD  04/11/24 1313       Darron Cherry MD  04/12/24 0730

## 2024-04-14 LAB
C TRACH DNA SPEC QL NAA+PROBE: NOT DETECTED
N GONORRHOEA DNA SPEC QL NAA+PROBE: NOT DETECTED

## 2024-05-06 ENCOUNTER — HOSPITAL ENCOUNTER (EMERGENCY)
Facility: HOSPITAL | Age: 19
Discharge: HOME | End: 2024-05-07
Attending: SURGERY
Payer: MEDICAID

## 2024-05-06 VITALS
HEART RATE: 87 BPM | SYSTOLIC BLOOD PRESSURE: 125 MMHG | OXYGEN SATURATION: 99 % | TEMPERATURE: 99 F | DIASTOLIC BLOOD PRESSURE: 80 MMHG | HEIGHT: 65 IN | BODY MASS INDEX: 33.02 KG/M2 | RESPIRATION RATE: 18 BRPM | WEIGHT: 198.19 LBS

## 2024-05-06 DIAGNOSIS — K52.9 GASTROENTERITIS: Primary | ICD-10-CM

## 2024-05-06 PROCEDURE — 99284 EMERGENCY DEPT VISIT MOD MDM: CPT | Mod: 25

## 2024-05-06 PROCEDURE — U0002 COVID-19 LAB TEST NON-CDC: HCPCS | Performed by: SURGERY

## 2024-05-06 PROCEDURE — 87651 STREP A DNA AMP PROBE: CPT | Performed by: SURGERY

## 2024-05-06 PROCEDURE — 87502 INFLUENZA DNA AMP PROBE: CPT | Performed by: SURGERY

## 2024-05-06 NOTE — Clinical Note
"Chari Schaefera" Marcel was seen and treated in our emergency department on 5/6/2024.  She may return to work on 05/10/2024.       If you have any questions or concerns, please don't hesitate to call.      Tala Iglesias RN    "

## 2024-05-07 LAB
ALBUMIN SERPL BCP-MCNC: 3.9 G/DL (ref 3.5–5.2)
ALP SERPL-CCNC: 93 U/L (ref 55–135)
ALT SERPL W/O P-5'-P-CCNC: 19 U/L (ref 10–44)
AMORPH CRY URNS QL MICRO: NORMAL
ANION GAP SERPL CALC-SCNC: 9 MMOL/L (ref 8–16)
AST SERPL-CCNC: 16 U/L (ref 10–40)
B-HCG UR QL: NEGATIVE
BACTERIA #/AREA URNS HPF: NORMAL /HPF
BASOPHILS # BLD AUTO: 0.03 K/UL (ref 0–0.2)
BASOPHILS NFR BLD: 0.3 % (ref 0–1.9)
BILIRUB SERPL-MCNC: 0.2 MG/DL (ref 0.1–1)
BILIRUB UR QL STRIP: NEGATIVE
BUN SERPL-MCNC: 8 MG/DL (ref 6–20)
CALCIUM SERPL-MCNC: 9.2 MG/DL (ref 8.7–10.5)
CHLORIDE SERPL-SCNC: 109 MMOL/L (ref 95–110)
CLARITY UR: ABNORMAL
CO2 SERPL-SCNC: 23 MMOL/L (ref 23–29)
COLOR UR: YELLOW
CREAT SERPL-MCNC: 0.8 MG/DL (ref 0.5–1.4)
DIFFERENTIAL METHOD BLD: ABNORMAL
EOSINOPHIL # BLD AUTO: 0.3 K/UL (ref 0–0.5)
EOSINOPHIL NFR BLD: 3.3 % (ref 0–8)
ERYTHROCYTE [DISTWIDTH] IN BLOOD BY AUTOMATED COUNT: 11.9 % (ref 11.5–14.5)
EST. GFR  (NO RACE VARIABLE): >60 ML/MIN/1.73 M^2
GLUCOSE SERPL-MCNC: 86 MG/DL (ref 70–110)
GLUCOSE UR QL STRIP: NEGATIVE
GROUP A STREP, MOLECULAR: NEGATIVE
HCT VFR BLD AUTO: 40.2 % (ref 37–48.5)
HGB BLD-MCNC: 13.6 G/DL (ref 12–16)
HGB UR QL STRIP: NEGATIVE
IMM GRANULOCYTES # BLD AUTO: 0.01 K/UL (ref 0–0.04)
IMM GRANULOCYTES NFR BLD AUTO: 0.1 % (ref 0–0.5)
INFLUENZA A, MOLECULAR: NEGATIVE
INFLUENZA B, MOLECULAR: NEGATIVE
KETONES UR QL STRIP: NEGATIVE
LEUKOCYTE ESTERASE UR QL STRIP: ABNORMAL
LIPASE SERPL-CCNC: 33 U/L (ref 4–60)
LYMPHOCYTES # BLD AUTO: 3.9 K/UL (ref 1–4.8)
LYMPHOCYTES NFR BLD: 44.5 % (ref 18–48)
MCH RBC QN AUTO: 31.5 PG (ref 27–31)
MCHC RBC AUTO-ENTMCNC: 33.8 G/DL (ref 32–36)
MCV RBC AUTO: 93 FL (ref 82–98)
MICROSCOPIC COMMENT: NORMAL
MONOCYTES # BLD AUTO: 0.9 K/UL (ref 0.3–1)
MONOCYTES NFR BLD: 10.9 % (ref 4–15)
NEUTROPHILS # BLD AUTO: 3.5 K/UL (ref 1.8–7.7)
NEUTROPHILS NFR BLD: 40.9 % (ref 38–73)
NITRITE UR QL STRIP: NEGATIVE
NRBC BLD-RTO: 0 /100 WBC
PH UR STRIP: 7 [PH] (ref 5–8)
PLATELET # BLD AUTO: 287 K/UL (ref 150–450)
PMV BLD AUTO: 8.5 FL (ref 9.2–12.9)
POTASSIUM SERPL-SCNC: 3.8 MMOL/L (ref 3.5–5.1)
PROT SERPL-MCNC: 6.7 G/DL (ref 6–8.4)
PROT UR QL STRIP: NEGATIVE
RBC # BLD AUTO: 4.32 M/UL (ref 4–5.4)
SARS-COV-2 RDRP RESP QL NAA+PROBE: NEGATIVE
SODIUM SERPL-SCNC: 141 MMOL/L (ref 136–145)
SP GR UR STRIP: 1.01 (ref 1–1.03)
SPECIMEN SOURCE: NORMAL
SQUAMOUS #/AREA URNS HPF: 1 /HPF
URN SPEC COLLECT METH UR: ABNORMAL
UROBILINOGEN UR STRIP-ACNC: NEGATIVE EU/DL
WBC # BLD AUTO: 8.66 K/UL (ref 3.9–12.7)
WBC #/AREA URNS HPF: 2 /HPF (ref 0–5)

## 2024-05-07 PROCEDURE — 96361 HYDRATE IV INFUSION ADD-ON: CPT

## 2024-05-07 PROCEDURE — 81000 URINALYSIS NONAUTO W/SCOPE: CPT | Performed by: SURGERY

## 2024-05-07 PROCEDURE — 80053 COMPREHEN METABOLIC PANEL: CPT | Performed by: SURGERY

## 2024-05-07 PROCEDURE — 85025 COMPLETE CBC W/AUTO DIFF WBC: CPT | Performed by: SURGERY

## 2024-05-07 PROCEDURE — 63600175 PHARM REV CODE 636 W HCPCS: Performed by: SURGERY

## 2024-05-07 PROCEDURE — 25000003 PHARM REV CODE 250: Performed by: SURGERY

## 2024-05-07 PROCEDURE — 96374 THER/PROPH/DIAG INJ IV PUSH: CPT

## 2024-05-07 PROCEDURE — 83690 ASSAY OF LIPASE: CPT | Performed by: SURGERY

## 2024-05-07 PROCEDURE — 81025 URINE PREGNANCY TEST: CPT | Performed by: SURGERY

## 2024-05-07 RX ORDER — ONDANSETRON 4 MG/1
4 TABLET, ORALLY DISINTEGRATING ORAL EVERY 8 HOURS PRN
Qty: 20 TABLET | Refills: 0 | Status: SHIPPED | OUTPATIENT
Start: 2024-05-07

## 2024-05-07 RX ORDER — DICYCLOMINE HYDROCHLORIDE 20 MG/1
20 TABLET ORAL 4 TIMES DAILY PRN
Qty: 15 TABLET | Refills: 0 | Status: SHIPPED | OUTPATIENT
Start: 2024-05-07 | End: 2024-06-06

## 2024-05-07 RX ORDER — PANTOPRAZOLE SODIUM 40 MG/1
40 TABLET, DELAYED RELEASE ORAL DAILY
Qty: 30 TABLET | Refills: 0 | Status: SHIPPED | OUTPATIENT
Start: 2024-05-07 | End: 2024-06-06

## 2024-05-07 RX ORDER — ONDANSETRON HYDROCHLORIDE 2 MG/ML
4 INJECTION, SOLUTION INTRAVENOUS
Status: COMPLETED | OUTPATIENT
Start: 2024-05-07 | End: 2024-05-07

## 2024-05-07 RX ADMIN — ONDANSETRON 4 MG: 2 INJECTION INTRAMUSCULAR; INTRAVENOUS at 01:05

## 2024-05-07 RX ADMIN — SODIUM CHLORIDE 1000 ML: 9 INJECTION, SOLUTION INTRAVENOUS at 12:05

## 2024-05-07 NOTE — ED PROVIDER NOTES
Encounter Date: 5/6/2024       History     Chief Complaint   Patient presents with    Vomiting     PT TO ER WITH C/O N/V/D X 4 DAYS. PT ALSO REPORTS ABDOMEN TENDER TO TOUCH AND HEADACHE     History of Present Illness  Chari Rod is a 18 y.o. female that presents with nausea & diarrhea  Nausea vomiting & watery diarrhea today, she thinks that she has a viral illness  Nontender abdomen, denies any blood in stool, no recent travel outside the country  Patient has no abdominal tenderness or signs of acute peritonitis on ER evaluation  Patient denies any hematuria dysuria denies any risk for STD or PID on evaluation  Patient denies any risk for tubo-ovarian abscess, denies any pregnancy risk today    The history is provided by the patient.     Review of patient's allergies indicates:   Allergen Reactions    Iodine Swelling    Shellfish derived Swelling     Past Medical History:   Diagnosis Date    ADD (attention deficit disorder)     Allergy     seasonal    Anxiety     Eczema      Past Surgical History:   Procedure Laterality Date    ARTHROSCOPIC DEBRIDEMENT OF WRIST Right 4/26/2021    Procedure: ARTHROSCOPY, WRIST, WITH DEBRIDEMENT, right;  Surgeon: Vale Rich MD;  Location: [facility];  Service: Orthopedics;  Laterality: Right;  [facility] [place]    ARTHROSCOPY OF KNEE Right 6/27/2018    Procedure: ARTHROSCOPY, KNEE;  Surgeon: Adrian Shannon MD;  Location: [address];  Service: Orthopedics;  Laterality: Right;    COLONOSCOPY N/A 1/22/2019    Procedure: COLONOSCOPY;  Surgeon: Miko Parmar MD;  Location: [facility];  Service: Endoscopy;  Laterality: N/A;    ESOPHAGOGASTRODUODENOSCOPY N/A 1/22/2019    Procedure: ESOPHAGOGASTRODUODENOSCOPY (EGD);  Surgeon: Miko Parmar MD;  Location: [facility];  Service: Endoscopy;  Laterality: N/A;    FEMUR OSTEOTOMY Right 2/27/2020    Procedure: OSTEOTOMY, FEMUR (RIGHT) - correction of genu valgum.  Orthopediatrics distal femur plate.  MTF Thorpe ingrids.;  Surgeon: Adrian DORAN  MD Shiva;  Location: [facility] OR 1ST FLR;  Service: Orthopedics;  Laterality: Right;  [facility] notified 2/21 MAL    OSTEOTOMY AND ULNAR SHORTENING Right 9/17/2021    Procedure: OSTEOTOMY, ULNA, FOR SHORTENING,RIGHT;  Surgeon: Vale Rich MD;  Location: [facility];  Service: Orthopedics;  Laterality: Right;  [facility]     REPAIR OF MENISCUS OF KNEE Right 6/27/2018    Procedure: REPAIR, MENISCUS, KNEE-- medial;  Surgeon: Adrian Shannon MD;  Location: [facility] OR 2ND FLR;  Service: Orthopedics;  Laterality: Right;    TONSILLECTOMY, ADENOIDECTOMY      TYMPANOSTOMY TUBE PLACEMENT       Family History   Problem Relation Name Age of Onset    ADD / ADHD Mother      Asthma Mother      Diabetes Mother      Eczema Mother      Thyroid disease Maternal Grandmother      Diabetes Maternal Grandfather      No Known Problems Father      No Known Problems Sister      No Known Problems Brother      No Known Problems Maternal Aunt      No Known Problems Maternal Uncle      No Known Problems Paternal Aunt      No Known Problems Paternal Uncle      No Known Problems Paternal Grandmother      No Known Problems Paternal Grandfather      Alcohol abuse Neg Hx      Allergies Neg Hx      Autism spectrum disorder Neg Hx      Behavior problems Neg Hx      Birth defects Neg Hx      Cancer Neg Hx      Chromosomal disorder Neg Hx      Cleft lip Neg Hx      Congenital heart disease Neg Hx      Depression Neg Hx      Early death Neg Hx      Hearing loss Neg Hx      Heart disease Neg Hx      Hyperlipidemia Neg Hx      Hypertension Neg Hx      Kidney disease Neg Hx      Learning disabilities Neg Hx      Mental illness Neg Hx      Migraines Neg Hx      Neurodegenerative disease Neg Hx      Obesity Neg Hx      Seizures Neg Hx      SIDS Neg Hx      Other Neg Hx       Social History     Tobacco Use    Smoking status: Never    Smokeless tobacco: Never   Substance Use Topics    Alcohol use: No    Drug use: No     Review of Systems    Constitutional: Negative.    HENT: Negative.     Eyes: Negative.    Respiratory: Negative.     Cardiovascular: Negative.    Gastrointestinal:  Positive for diarrhea, nausea and vomiting.   Genitourinary: Negative.    Musculoskeletal: Negative.    Skin: Negative.    Neurological: Negative.    Psychiatric/Behavioral: Negative.         Physical Exam     Initial Vitals [05/06/24 2348]   BP Pulse Resp Temp SpO2   125/80 87 18 98.6 °F (37 °C) 99 %      MAP       --         Physical Exam    Nursing note and vitals reviewed.  Constitutional: Vital signs are normal. She appears well-developed and well-nourished. She is cooperative.   HENT:   Head: Normocephalic and atraumatic.   Eyes: Conjunctivae, EOM and lids are normal. Pupils are equal, round, and reactive to light.   Neck: Trachea normal and phonation normal. Neck supple. No JVD present.   Normal range of motion.   Full passive range of motion without pain.     Cardiovascular:  Normal rate, regular rhythm, S1 normal, S2 normal, normal heart sounds, intact distal pulses and normal pulses.           Pulmonary/Chest: Effort normal and breath sounds normal.   Abdominal: Abdomen is soft and flat. Bowel sounds are normal.   Musculoskeletal:         General: Normal range of motion.      Cervical back: Full passive range of motion without pain, normal range of motion and neck supple.     Neurological: She is alert and oriented to person, place, and time. She has normal strength.   Skin: Skin is warm, dry and intact. Capillary refill takes less than 2 seconds.         ED Course   Procedures  Labs Reviewed   CBC W/ AUTO DIFFERENTIAL - Abnormal; Notable for the following components:       Result Value    MCH 31.5 (*)     MPV 8.5 (*)     All other components within normal limits   URINALYSIS, REFLEX TO URINE CULTURE - Abnormal; Notable for the following components:    Appearance, UA Hazy (*)     Leukocytes, UA 1+ (*)     All other components within normal limits    Narrative:      Specimen Source->Urine   INFLUENZA A & B BY MOLECULAR   GROUP A STREP, MOLECULAR   SARS-COV-2 RNA AMPLIFICATION, QUAL   COMPREHENSIVE METABOLIC PANEL   PREGNANCY TEST, URINE RAPID    Narrative:     Specimen Source->Urine   LIPASE   URINALYSIS MICROSCOPIC    Narrative:     Specimen Source->Urine          Imaging Results    None          Medications   ondansetron injection 4 mg (has no administration in time range)   sodium chloride 0.9% bolus 1,000 mL 1,000 mL (1,000 mLs Intravenous New Bag 5/7/24 0009)     Medical Decision Making  18-year-old female presents with nausea vomiting & diarrhea this evening  Differential includes enteritis, gastroenteritis, viral illness, pregnancy, UTI  Differential also includes influenza, COVID, dehydration, pyelonephritis    Problems Addressed:  Gastroenteritis: complicated acute illness or injury    Amount and/or Complexity of Data Reviewed  Labs: ordered. Decision-making details documented in ED Course.    ED Management & Risks of Complication, Morbidity, & Mortality:  Lab work within normal limits, IV fluids given in the ER today  IV antiemetics given in the emergency room, no fever on triage  Nontender abdomen on clinical evaluation, (-) UPT in the ER now  No signs of definitive urinary tract infection, feeling better after IVF  Prescription of Zofran, Bentyl & Protonix on discharge this evening  Pt/Family counseled to return to the ER with any concerning symptoms     Need for Hospitalization or Surgery with Social Determinants of Health:  This patient does not need to be hospitalized on ER evaluation today  The patient's diagnosis is not limited by social determinants of health  Does not require surgery or procedure (major or minor), no risk factors     Clinical Impression:  Final diagnoses:  [K52.9] Gastroenteritis (Primary)          ED Disposition Condition    Discharge Stable          ED Prescriptions       Medication Sig Dispense Start Date End Date Auth. Provider     pantoprazole (PROTONIX) 40 MG tablet Take 1 tablet (40 mg total) by mouth once daily. 30 tablet 5/7/2024 6/6/2024 Magdaleno Pena MD    ondansetron (ZOFRAN-ODT) 4 MG TbDL Take 1 tablet (4 mg total) by mouth every 8 (eight) hours as needed (nausea). 20 tablet 5/7/2024 -- Magdaleno Pena MD    dicyclomine (BENTYL) 20 mg tablet Take 1 tablet (20 mg total) by mouth 4 (four) times daily as needed (CRAMPS). 15 tablet 5/7/2024 6/6/2024 Magdaleno Pena MD          Follow-up Information       Follow up With Specialties Details Why Contact Info    Aneta Low NP Family Medicine Schedule an appointment as soon as possible for a visit in 2 days  [address] [facility] [address]  [phone]               Magdaleno Pena MD  05/07/24 0117

## 2024-05-16 ENCOUNTER — HOSPITAL ENCOUNTER (EMERGENCY)
Facility: HOSPITAL | Age: 19
Discharge: HOME | End: 2024-05-17
Attending: SURGERY
Payer: MEDICAID

## 2024-05-16 DIAGNOSIS — R11.2 NAUSEA & VOMITING: ICD-10-CM

## 2024-05-16 DIAGNOSIS — F41.0 ANXIETY ATTACK: Primary | ICD-10-CM

## 2024-05-16 LAB
ALBUMIN SERPL BCP-MCNC: 3.9 G/DL (ref 3.5–5.2)
ALP SERPL-CCNC: 95 U/L (ref 55–135)
ALT SERPL W/O P-5'-P-CCNC: 38 U/L (ref 10–44)
AMORPH CRY URNS QL MICRO: ABNORMAL
AMPHET+METHAMPHET UR QL: NEGATIVE
ANION GAP SERPL CALC-SCNC: 10 MMOL/L (ref 8–16)
AST SERPL-CCNC: 39 U/L (ref 10–40)
BACTERIA #/AREA URNS HPF: ABNORMAL /HPF
BARBITURATES UR QL SCN>200 NG/ML: NEGATIVE
BASOPHILS # BLD AUTO: 0.06 K/UL (ref 0–0.2)
BASOPHILS NFR BLD: 0.5 % (ref 0–1.9)
BENZODIAZ UR QL SCN>200 NG/ML: NEGATIVE
BILIRUB SERPL-MCNC: 0.2 MG/DL (ref 0.1–1)
BILIRUB UR QL STRIP: ABNORMAL
BUN SERPL-MCNC: 9 MG/DL (ref 6–20)
BZE UR QL SCN: NEGATIVE
CALCIUM SERPL-MCNC: 9.4 MG/DL (ref 8.7–10.5)
CANNABINOIDS UR QL SCN: NEGATIVE
CHLORIDE SERPL-SCNC: 108 MMOL/L (ref 95–110)
CLARITY UR: ABNORMAL
CO2 SERPL-SCNC: 22 MMOL/L (ref 23–29)
COLOR UR: YELLOW
CREAT SERPL-MCNC: 0.8 MG/DL (ref 0.5–1.4)
CREAT UR-MCNC: 325 MG/DL (ref 15–325)
DIFFERENTIAL METHOD BLD: ABNORMAL
EOSINOPHIL # BLD AUTO: 0.2 K/UL (ref 0–0.5)
EOSINOPHIL NFR BLD: 1.4 % (ref 0–8)
ERYTHROCYTE [DISTWIDTH] IN BLOOD BY AUTOMATED COUNT: 11.7 % (ref 11.5–14.5)
EST. GFR  (NO RACE VARIABLE): >60 ML/MIN/1.73 M^2
GLUCOSE SERPL-MCNC: 102 MG/DL (ref 70–110)
GLUCOSE UR QL STRIP: NEGATIVE
HCT VFR BLD AUTO: 38.9 % (ref 37–48.5)
HGB BLD-MCNC: 13.3 G/DL (ref 12–16)
HGB UR QL STRIP: ABNORMAL
HYALINE CASTS #/AREA URNS LPF: 0 /LPF
IMM GRANULOCYTES # BLD AUTO: 0.05 K/UL (ref 0–0.04)
IMM GRANULOCYTES NFR BLD AUTO: 0.4 % (ref 0–0.5)
KETONES UR QL STRIP: ABNORMAL
LEUKOCYTE ESTERASE UR QL STRIP: NEGATIVE
LIPASE SERPL-CCNC: 26 U/L (ref 4–60)
LYMPHOCYTES # BLD AUTO: 2.6 K/UL (ref 1–4.8)
LYMPHOCYTES NFR BLD: 19.5 % (ref 18–48)
MCH RBC QN AUTO: 31.5 PG (ref 27–31)
MCHC RBC AUTO-ENTMCNC: 34.2 G/DL (ref 32–36)
MCV RBC AUTO: 92 FL (ref 82–98)
METHADONE UR QL SCN>300 NG/ML: NEGATIVE
MICROSCOPIC COMMENT: ABNORMAL
MONOCYTES # BLD AUTO: 1.1 K/UL (ref 0.3–1)
MONOCYTES NFR BLD: 7.9 % (ref 4–15)
NEUTROPHILS # BLD AUTO: 9.3 K/UL (ref 1.8–7.7)
NEUTROPHILS NFR BLD: 70.3 % (ref 38–73)
NITRITE UR QL STRIP: NEGATIVE
NRBC BLD-RTO: 0 /100 WBC
OPIATES UR QL SCN: NEGATIVE
PCP UR QL SCN>25 NG/ML: NEGATIVE
PH UR STRIP: 6 [PH] (ref 5–8)
PLATELET # BLD AUTO: 304 K/UL (ref 150–450)
PMV BLD AUTO: 8.4 FL (ref 9.2–12.9)
POTASSIUM SERPL-SCNC: 3.9 MMOL/L (ref 3.5–5.1)
PROT SERPL-MCNC: 7.1 G/DL (ref 6–8.4)
PROT UR QL STRIP: ABNORMAL
RBC # BLD AUTO: 4.22 M/UL (ref 4–5.4)
RBC #/AREA URNS HPF: 2 /HPF (ref 0–4)
SODIUM SERPL-SCNC: 140 MMOL/L (ref 136–145)
SP GR UR STRIP: >=1.03 (ref 1–1.03)
SQUAMOUS #/AREA URNS HPF: ABNORMAL /HPF
TOXICOLOGY INFORMATION: NORMAL
TROPONIN I SERPL DL<=0.01 NG/ML-MCNC: 0.01 NG/ML (ref 0–0.03)
URN SPEC COLLECT METH UR: ABNORMAL
UROBILINOGEN UR STRIP-ACNC: 1 EU/DL
WBC # BLD AUTO: 13.25 K/UL (ref 3.9–12.7)
WBC #/AREA URNS HPF: 4 /HPF (ref 0–5)

## 2024-05-16 PROCEDURE — 93005 ELECTROCARDIOGRAM TRACING: CPT

## 2024-05-16 PROCEDURE — 81000 URINALYSIS NONAUTO W/SCOPE: CPT | Mod: 59 | Performed by: SURGERY

## 2024-05-16 PROCEDURE — 85025 COMPLETE CBC W/AUTO DIFF WBC: CPT | Performed by: SURGERY

## 2024-05-16 PROCEDURE — 93010 ELECTROCARDIOGRAM REPORT: CPT | Mod: ,,, | Performed by: INTERNAL MEDICINE

## 2024-05-16 PROCEDURE — 99284 EMERGENCY DEPT VISIT MOD MDM: CPT | Mod: 25

## 2024-05-16 PROCEDURE — 99900035 HC TECH TIME PER 15 MIN (STAT)

## 2024-05-16 PROCEDURE — 84484 ASSAY OF TROPONIN QUANT: CPT | Performed by: SURGERY

## 2024-05-16 PROCEDURE — 63600175 PHARM REV CODE 636 W HCPCS: Performed by: SURGERY

## 2024-05-16 PROCEDURE — 96375 TX/PRO/DX INJ NEW DRUG ADDON: CPT

## 2024-05-16 PROCEDURE — 81025 URINE PREGNANCY TEST: CPT | Performed by: SURGERY

## 2024-05-16 PROCEDURE — 25000003 PHARM REV CODE 250: Performed by: SURGERY

## 2024-05-16 PROCEDURE — 96361 HYDRATE IV INFUSION ADD-ON: CPT

## 2024-05-16 PROCEDURE — 99900031 HC PATIENT EDUCATION (STAT)

## 2024-05-16 PROCEDURE — 83690 ASSAY OF LIPASE: CPT | Performed by: SURGERY

## 2024-05-16 PROCEDURE — 80053 COMPREHEN METABOLIC PANEL: CPT | Performed by: SURGERY

## 2024-05-16 PROCEDURE — 96374 THER/PROPH/DIAG INJ IV PUSH: CPT

## 2024-05-16 PROCEDURE — 80307 DRUG TEST PRSMV CHEM ANLYZR: CPT | Performed by: SURGERY

## 2024-05-16 RX ORDER — LORAZEPAM 2 MG/ML
1 INJECTION INTRAMUSCULAR
Status: COMPLETED | OUTPATIENT
Start: 2024-05-16 | End: 2024-05-16

## 2024-05-16 RX ORDER — ONDANSETRON HYDROCHLORIDE 2 MG/ML
4 INJECTION, SOLUTION INTRAVENOUS
Status: COMPLETED | OUTPATIENT
Start: 2024-05-16 | End: 2024-05-16

## 2024-05-16 RX ADMIN — LORAZEPAM 1 MG: 2 INJECTION INTRAMUSCULAR; INTRAVENOUS at 11:05

## 2024-05-16 RX ADMIN — SODIUM CHLORIDE 1000 ML: 9 INJECTION, SOLUTION INTRAVENOUS at 11:05

## 2024-05-16 RX ADMIN — ONDANSETRON 4 MG: 2 INJECTION INTRAMUSCULAR; INTRAVENOUS at 11:05

## 2024-05-17 VITALS
SYSTOLIC BLOOD PRESSURE: 104 MMHG | BODY MASS INDEX: 33.04 KG/M2 | HEIGHT: 65 IN | WEIGHT: 198.31 LBS | HEART RATE: 107 BPM | RESPIRATION RATE: 20 BRPM | TEMPERATURE: 99 F | DIASTOLIC BLOOD PRESSURE: 72 MMHG | OXYGEN SATURATION: 100 %

## 2024-05-17 LAB
B-HCG UR QL: NEGATIVE
OHS QRS DURATION: 72 MS
OHS QTC CALCULATION: 443 MS

## 2024-05-17 NOTE — ED PROVIDER NOTES
Encounter Date: 5/16/2024       History     Chief Complaint   Patient presents with    Panic Attack     PT TO ER WITH C/O PANIC ATTACK. PT ALSO REPORTS VOMITING THAT BEGAN EARLIER TODAY.     History of Present Illness  Chari Rod is a 18 y.o. female that presents with anxiety attack  Patient also with nausea vomiting, has emesis with anxiety attacks per HX  Patient was crying & having anxiety attack on ER arrival this early evening  Patient was in the company of her boyfriend used as she has severe anxiety  Sinus tachycardia on EKG with no ST elevation on ER assessment today    The history is provided by the patient and the spouse.     Review of patient's allergies indicates:   Allergen Reactions    Iodine Swelling    Shellfish derived Swelling     Past Medical History:   Diagnosis Date    ADD (attention deficit disorder)     Allergy     seasonal    Anxiety     Eczema      Past Surgical History:   Procedure Laterality Date    ARTHROSCOPIC DEBRIDEMENT OF WRIST Right 4/26/2021    Procedure: ARTHROSCOPY, WRIST, WITH DEBRIDEMENT, right;  Surgeon: Vale Rich MD;  Location: [facility];  Service: Orthopedics;  Laterality: Right;  [facility]    ARTHROSCOPY OF KNEE Right 6/27/2018    Procedure: ARTHROSCOPY, KNEE;  Surgeon: Adrian Shannon MD;  Location: [facility] 2[facility];  Service: Orthopedics;  Laterality: Right;    COLONOSCOPY N/A 1/22/2019    Procedure: COLONOSCOPY;  Surgeon: Miko Parmar MD;  [facility];  Service: Endoscopy;  Laterality: N/A;    ESOPHAGOGASTRODUODENOSCOPY N/A 1/22/2019    Procedure: ESOPHAGOGASTRODUODENOSCOPY (EGD);  Surgeon: Miko Parmar MD;  [facility];  Service: Endoscopy;  Laterality: N/A;    FEMUR OSTEOTOMY Right 2/27/2020    Procedure: OSTEOTOMY, FEMUR (RIGHT) - correction of genu valgum.  Orthopediatrics distal femur plate.  MTF Thorpe wedges.;  Surgeon: Adrian Shannon MD;  Location: [facility] OR 1ST FLR;  Service: Orthopedics;  Laterality: Right;  [facility] notified 2/21 [facility]    OSTEOTOMY AND ULNAR SHORTENING Right 9/17/2021    Procedure: OSTEOTOMY, ULNA, FOR SHORTENING,RIGHT;  Surgeon: Vale Rich MD;  [facility];  Service: Orthopedics;  Laterality: Right;  MAC/REGIONAL     REPAIR OF MENISCUS OF KNEE Right 6/27/2018    Procedure: REPAIR, MENISCUS, KNEE-- medial;  Surgeon: Adrian Shannon MD;  Location: [address];  Service: Orthopedics;  Laterality: Right;    TONSILLECTOMY, ADENOIDECTOMY      TYMPANOSTOMY TUBE PLACEMENT       Family History   Problem Relation Name Age of Onset    ADD / ADHD Mother      Asthma Mother      Diabetes Mother      Eczema Mother      Thyroid disease Maternal Grandmother      Diabetes Maternal Grandfather      No Known Problems Father      No Known Problems Sister      No Known Problems Brother      No Known Problems Maternal Aunt      No Known Problems Maternal Uncle      No Known Problems Paternal Aunt      No Known Problems Paternal Uncle      No Known Problems Paternal Grandmother      No Known Problems Paternal Grandfather      Alcohol abuse Neg Hx      Allergies Neg Hx      Autism spectrum disorder Neg Hx      Behavior problems Neg Hx      Birth defects Neg Hx      Cancer Neg Hx      Chromosomal disorder Neg Hx      Cleft lip Neg Hx      Congenital heart disease Neg Hx      Depression Neg Hx      Early death Neg Hx      Hearing loss Neg Hx      Heart disease Neg Hx      Hyperlipidemia Neg Hx      Hypertension Neg Hx      Kidney disease Neg Hx      Learning disabilities Neg Hx      Mental illness Neg Hx      Migraines Neg Hx      Neurodegenerative disease Neg Hx      Obesity Neg Hx      Seizures Neg Hx      SIDS Neg Hx      Other Neg Hx       Social History     Tobacco Use    Smoking status: Never    Smokeless tobacco: Never   Substance Use Topics    Alcohol use: No    Drug use: No     Review of Systems   Constitutional: Negative.    HENT: Negative.     Eyes: Negative.    Respiratory: Negative.      Cardiovascular: Negative.    Gastrointestinal:  Positive for nausea and vomiting.   Genitourinary: Negative.    Musculoskeletal: Negative.    Skin: Negative.    Neurological: Negative.    Psychiatric/Behavioral:  The patient is nervous/anxious.        Physical Exam     Initial Vitals [05/16/24 2236]   BP Pulse Resp Temp SpO2   133/80 (!) 135 20 99.3 °F (37.4 °C) 97 %      MAP       --         Physical Exam    Nursing note and vitals reviewed.  Constitutional: Vital signs are normal. She appears well-developed and well-nourished. She is cooperative.   HENT:   Head: Normocephalic and atraumatic.   Eyes: Conjunctivae, EOM and lids are normal. Pupils are equal, round, and reactive to light.   Neck: Trachea normal and phonation normal. Neck supple. No JVD present.   Normal range of motion.   Full passive range of motion without pain.     Cardiovascular:  Normal rate, regular rhythm, S1 normal, S2 normal, normal heart sounds, intact distal pulses and normal pulses.           Pulmonary/Chest: Effort normal and breath sounds normal.   Abdominal: Abdomen is soft and flat. Bowel sounds are normal.   Musculoskeletal:         General: Normal range of motion.      Cervical back: Full passive range of motion without pain, normal range of motion and neck supple.     Neurological: She is alert and oriented to person, place, and time. She has normal strength.   Skin: Skin is warm, dry and intact. Capillary refill takes less than 2 seconds.         ED Course   Procedures  Labs Reviewed   COMPREHENSIVE METABOLIC PANEL - Abnormal; Notable for the following components:       Result Value    CO2 22 (*)     All other components within normal limits   CBC W/ AUTO DIFFERENTIAL - Abnormal; Notable for the following components:    WBC 13.25 (*)     MCH 31.5 (*)     MPV 8.4 (*)     Gran # (ANC) 9.3 (*)     Immature Grans (Abs) 0.05 (*)     Mono # 1.1 (*)     All other components within normal limits   URINALYSIS, REFLEX TO URINE CULTURE -  Abnormal; Notable for the following components:    Appearance, UA Cloudy (*)     Specific Gravity, UA >=1.030 (*)     Protein, UA 1+ (*)     Ketones, UA Trace (*)     Bilirubin (UA) 1+ (*)     Occult Blood UA Trace (*)     All other components within normal limits    Narrative:     Specimen Source->Urine   URINALYSIS MICROSCOPIC - Abnormal; Notable for the following components:    Bacteria Few (*)     All other components within normal limits    Narrative:     Specimen Source->Urine   TROPONIN I   DRUG SCREEN PANEL, URINE EMERGENCY    Narrative:     Specimen Source->Urine   PREGNANCY TEST, URINE RAPID    Narrative:     Specimen Source->Urine   LIPASE     EKG Readings: (Independently Interpreted)   No STEMI  Normal sinus rhythm  No ectopy  Normal conduction  Normal ST segments  Normal T-wave  Normal axis  Heart rate in the 100s       Imaging Results    None          Medications   sodium chloride 0.9% bolus 1,000 mL 1,000 mL (0 mLs Intravenous Stopped 5/17/24 0009)   ondansetron injection 4 mg (4 mg Intravenous Given 5/16/24 2306)   LORazepam injection 1 mg (1 mg Intravenous Given 5/16/24 2306)     Medical Decision Making  18-year-old female presents with anxiety attack this early evening  Often her anxiety attacks are accompanied with nausea vomiting  Differential includes general anxiety disorder, panic attack, anxiety   Differential also includes enteritis, gastroenteritis, pregnancy, UTI    Problems Addressed:  Anxiety attack: complicated acute illness or injury  Nausea & vomiting: complicated acute illness or injury    Amount and/or Complexity of Data Reviewed  Labs: ordered. Decision-making details documented in ED Course.    ED Management & Risks of Complication, Morbidity, & Mortality:  Sinus tachycardia on EKG with no ST changes noted tonight  IV fluids given, IV Ativan given, IV Zofran given in the ER  Patient was feeling 100% better & is completely asymptomatic  Mild leukocytosis on lab work, otherwise  within normal limits  (-) pregnancy test, patient was requesting discharge now    Clinical Impression:  Final diagnoses:  [R11.2] Nausea & vomiting  [F41.0] Anxiety attack (Primary)          ED Disposition Condition    Discharge Stable          ED Prescriptions    None       Follow-up Information       Follow up With Specialties Details Why Contact Info    Aneta Low NP Family Medicine Schedule an appointment as soon as possible for a visit in 2 days  [address] [facility] [place] [address]  [phone]               Magdaleno Pena MD  05/17/24 0304

## 2024-06-16 NOTE — DISCHARGE INSTRUCTIONS
Please follow up with your primary care physician within 2 days. Ensure that you review all lab work results and/or imaging results. If you have any questions about your discharge paperwork please call the Emergency Department.     Return to the Emergency Department if any fever, increasing pain, redness, or discharge appear.     Thank you for visiting [facility], Department of Emergency Medicine. Please see the entirety of the educational materials provided. Please note that a visit to the emergency department does not substitute ongoing care from a primary medical provider or specialist. Please ensure to follow up as recommended. However, please return to the emergency department immediately if symptoms do not improve as discussed, symptoms worsen, new symptoms develop, difficulty in following up or for any of your concerns or issues. Please note on discharge you are acknowledging understanding and agreement on medical evaluation, management recommendations and follow up recommendations.     
No

## 2024-07-12 ENCOUNTER — PROCEDURE VISIT (OUTPATIENT)
Dept: OBSTETRICS AND GYNECOLOGY | Facility: CLINIC | Age: 19
End: 2024-07-12
Payer: MEDICAID

## 2024-07-12 ENCOUNTER — NURSE TRIAGE (OUTPATIENT)
Dept: ADMINISTRATIVE | Facility: CLINIC | Age: 19
End: 2024-07-12
Payer: MEDICAID

## 2024-07-12 ENCOUNTER — PATIENT MESSAGE (OUTPATIENT)
Dept: OBSTETRICS AND GYNECOLOGY | Facility: CLINIC | Age: 19
End: 2024-07-12

## 2024-07-12 VITALS
WEIGHT: 200.38 LBS | DIASTOLIC BLOOD PRESSURE: 76 MMHG | SYSTOLIC BLOOD PRESSURE: 126 MMHG | BODY MASS INDEX: 33.38 KG/M2 | HEIGHT: 65 IN

## 2024-07-12 DIAGNOSIS — Z30.011 OCP (ORAL CONTRACEPTIVE PILLS) INITIATION: ICD-10-CM

## 2024-07-12 DIAGNOSIS — Z30.46 NEXPLANON REMOVAL: Primary | ICD-10-CM

## 2024-07-12 RX ORDER — LEVONORGESTREL/ETHIN.ESTRADIOL 0.1-0.02MG
1 TABLET ORAL DAILY
Qty: 84 TABLET | Refills: 0 | Status: SHIPPED | OUTPATIENT
Start: 2024-07-12 | End: 2024-10-12

## 2024-07-12 NOTE — PROCEDURES
Would like to switch to OCPs. Discussed risks of DVT development with birth control use.  Pt denies history of blood clots, DVT, cardiac issues, HTN, CVA, gallbladder disease, liver disease, smoking, migraines with an aura, or adrenal disease.  Pt denies family hx of DVT. Refill sent for aviane. Instructed pt to start pills on today with one daily.  Set a reminder to prevent forgetting.  May experience nausea, HAs, or irregular bleeding in the first three months.  RANDALL Hair-BC

## 2024-07-12 NOTE — TELEPHONE ENCOUNTER
Unable to reach for requested callback.   Reason for Disposition   Third attempt to contact caller AND no contact made. Phone number verified.    Protocols used: No Contact or Duplicate Contact Call-A-AH

## 2024-07-12 NOTE — PROCEDURES
Removal of Nexplanon Device    Date/Time: 7/12/2024 9:00 AM    Performed by: Irma Grewal NP  Authorized by: Irma Grewal NP    Consent given by:  Patient  Procedure risks and benefits discussed: yes    Patient questions answered: yes    Patient agrees, verbalizes understanding, and wants to proceed: yes    Educational handouts given: no    Instructions and paperwork completed: yes    Implant grasped by: hemostat  Removal due to infection and inflammatory reaction: no    Other reason for removal:  Recurrent nausea; pinching pain  Removal due to mechanical complications of IUD/Nexplanon: no    Removed with no complications: yes     Pt tolerated wellno  Arm: left arm  Palpation confirms location: yesno  Upon removal device was intact: yes  Site was close with steri-strips and pressure bandage applied: yes  Pre-procedure timeout performed: yes  Prepped with:  chlorhexidine gluconate  Local anesthetic:  Lidocaine with epinephrine   The site was cleaned  and prepped in a sterile fashion: yes  Specimen sent to pathology: Yes

## 2024-07-22 ENCOUNTER — HOSPITAL ENCOUNTER (EMERGENCY)
Facility: HOSPITAL | Age: 19
Discharge: LEFT BEFORE DISCHARGE | End: 2024-07-22
Attending: SURGERY
Payer: MEDICAID

## 2024-07-22 VITALS
HEART RATE: 90 BPM | WEIGHT: 205.5 LBS | SYSTOLIC BLOOD PRESSURE: 130 MMHG | TEMPERATURE: 98 F | BODY MASS INDEX: 34.19 KG/M2 | RESPIRATION RATE: 20 BRPM | DIASTOLIC BLOOD PRESSURE: 81 MMHG | OXYGEN SATURATION: 99 %

## 2024-07-22 DIAGNOSIS — Z53.29 LEFT AGAINST MEDICAL ADVICE: Primary | ICD-10-CM

## 2024-07-22 DIAGNOSIS — R11.2 NAUSEA AND VOMITING, UNSPECIFIED VOMITING TYPE: ICD-10-CM

## 2024-07-22 LAB
B-HCG UR QL: NEGATIVE
BILIRUB UR QL STRIP: NEGATIVE
CLARITY UR: CLEAR
COLOR UR: YELLOW
GLUCOSE UR QL STRIP: NEGATIVE
HGB UR QL STRIP: ABNORMAL
KETONES UR QL STRIP: NEGATIVE
LEUKOCYTE ESTERASE UR QL STRIP: NEGATIVE
NITRITE UR QL STRIP: NEGATIVE
PH UR STRIP: 7 [PH] (ref 5–8)
PROT UR QL STRIP: NEGATIVE
SP GR UR STRIP: 1.02 (ref 1–1.03)
URN SPEC COLLECT METH UR: ABNORMAL
UROBILINOGEN UR STRIP-ACNC: NEGATIVE EU/DL

## 2024-07-22 PROCEDURE — 81003 URINALYSIS AUTO W/O SCOPE: CPT | Performed by: SURGERY

## 2024-07-22 PROCEDURE — 81025 URINE PREGNANCY TEST: CPT | Performed by: SURGERY

## 2024-07-22 PROCEDURE — 99900041 HC LEFT WITHOUT BEING SEEN- EMERGENCY

## 2024-07-23 NOTE — ED NOTES
Pt reports she is tired and ready to leave. Discussed risks against leaving. Verbalized understanding. AMA form signed at this time. Amb of out ER with steady gait

## 2024-07-23 NOTE — ED PROVIDER NOTES
This patient left against medical advice before being seen by any physician  This patient was not examined by any provider, left before that evaluation       Magdaleno Pena M.D. 8:27 PM 7/22/2024       Magdaleno Pean MD  07/22/24 2027

## 2024-07-24 ENCOUNTER — HOSPITAL ENCOUNTER (EMERGENCY)
Facility: HOSPITAL | Age: 19
Discharge: HOME | End: 2024-07-24
Attending: EMERGENCY MEDICINE
Payer: MEDICAID

## 2024-07-24 VITALS
WEIGHT: 199.94 LBS | TEMPERATURE: 98 F | OXYGEN SATURATION: 99 % | HEART RATE: 90 BPM | BODY MASS INDEX: 33.27 KG/M2 | RESPIRATION RATE: 16 BRPM | SYSTOLIC BLOOD PRESSURE: 120 MMHG | DIASTOLIC BLOOD PRESSURE: 78 MMHG

## 2024-07-24 DIAGNOSIS — K52.9 GASTROENTERITIS: Primary | ICD-10-CM

## 2024-07-24 LAB
ALBUMIN SERPL BCP-MCNC: 4 G/DL (ref 3.5–5.2)
ALP SERPL-CCNC: 82 U/L (ref 55–135)
ALT SERPL W/O P-5'-P-CCNC: 20 U/L (ref 10–44)
ANION GAP SERPL CALC-SCNC: 6 MMOL/L (ref 8–16)
AST SERPL-CCNC: 15 U/L (ref 10–40)
B-HCG UR QL: NEGATIVE
BASOPHILS # BLD AUTO: 0.03 K/UL (ref 0–0.2)
BASOPHILS NFR BLD: 0.4 % (ref 0–1.9)
BILIRUB SERPL-MCNC: 0.4 MG/DL (ref 0.1–1)
BILIRUB UR QL STRIP: NEGATIVE
BUN SERPL-MCNC: 9 MG/DL (ref 6–20)
CALCIUM SERPL-MCNC: 9.1 MG/DL (ref 8.7–10.5)
CHLORIDE SERPL-SCNC: 110 MMOL/L (ref 95–110)
CLARITY UR: CLEAR
CO2 SERPL-SCNC: 24 MMOL/L (ref 23–29)
COLOR UR: YELLOW
CREAT SERPL-MCNC: 0.7 MG/DL (ref 0.5–1.4)
DIFFERENTIAL METHOD BLD: ABNORMAL
EOSINOPHIL # BLD AUTO: 0.2 K/UL (ref 0–0.5)
EOSINOPHIL NFR BLD: 2.2 % (ref 0–8)
ERYTHROCYTE [DISTWIDTH] IN BLOOD BY AUTOMATED COUNT: 12.1 % (ref 11.5–14.5)
EST. GFR  (NO RACE VARIABLE): >60 ML/MIN/1.73 M^2
GLUCOSE SERPL-MCNC: 107 MG/DL (ref 70–110)
GLUCOSE UR QL STRIP: NEGATIVE
HCT VFR BLD AUTO: 41.6 % (ref 37–48.5)
HGB BLD-MCNC: 14.2 G/DL (ref 12–16)
HGB UR QL STRIP: NEGATIVE
IMM GRANULOCYTES # BLD AUTO: 0.03 K/UL (ref 0–0.04)
IMM GRANULOCYTES NFR BLD AUTO: 0.4 % (ref 0–0.5)
INFLUENZA A, MOLECULAR: NEGATIVE
INFLUENZA B, MOLECULAR: NEGATIVE
KETONES UR QL STRIP: NEGATIVE
LEUKOCYTE ESTERASE UR QL STRIP: NEGATIVE
LIPASE SERPL-CCNC: 24 U/L (ref 4–60)
LYMPHOCYTES # BLD AUTO: 2.2 K/UL (ref 1–4.8)
LYMPHOCYTES NFR BLD: 29.9 % (ref 18–48)
MCH RBC QN AUTO: 32.1 PG (ref 27–31)
MCHC RBC AUTO-ENTMCNC: 34.1 G/DL (ref 32–36)
MCV RBC AUTO: 94 FL (ref 82–98)
MONOCYTES # BLD AUTO: 0.6 K/UL (ref 0.3–1)
MONOCYTES NFR BLD: 7.8 % (ref 4–15)
NEUTROPHILS # BLD AUTO: 4.3 K/UL (ref 1.8–7.7)
NEUTROPHILS NFR BLD: 59.3 % (ref 38–73)
NITRITE UR QL STRIP: NEGATIVE
NRBC BLD-RTO: 0 /100 WBC
PH UR STRIP: 7 [PH] (ref 5–8)
PLATELET # BLD AUTO: 293 K/UL (ref 150–450)
PMV BLD AUTO: 8.3 FL (ref 9.2–12.9)
POTASSIUM SERPL-SCNC: 4 MMOL/L (ref 3.5–5.1)
PROT SERPL-MCNC: 7 G/DL (ref 6–8.4)
PROT UR QL STRIP: NEGATIVE
RBC # BLD AUTO: 4.42 M/UL (ref 4–5.4)
SARS-COV-2 RDRP RESP QL NAA+PROBE: NEGATIVE
SODIUM SERPL-SCNC: 140 MMOL/L (ref 136–145)
SP GR UR STRIP: 1.01 (ref 1–1.03)
SPECIMEN SOURCE: NORMAL
URN SPEC COLLECT METH UR: NORMAL
UROBILINOGEN UR STRIP-ACNC: NEGATIVE EU/DL
WBC # BLD AUTO: 7.22 K/UL (ref 3.9–12.7)

## 2024-07-24 PROCEDURE — 99284 EMERGENCY DEPT VISIT MOD MDM: CPT | Mod: 25

## 2024-07-24 PROCEDURE — 87502 INFLUENZA DNA AMP PROBE: CPT | Performed by: EMERGENCY MEDICINE

## 2024-07-24 PROCEDURE — 80053 COMPREHEN METABOLIC PANEL: CPT | Performed by: NURSE PRACTITIONER

## 2024-07-24 PROCEDURE — 83690 ASSAY OF LIPASE: CPT | Performed by: NURSE PRACTITIONER

## 2024-07-24 PROCEDURE — 81003 URINALYSIS AUTO W/O SCOPE: CPT | Performed by: NURSE PRACTITIONER

## 2024-07-24 PROCEDURE — 85025 COMPLETE CBC W/AUTO DIFF WBC: CPT | Performed by: NURSE PRACTITIONER

## 2024-07-24 PROCEDURE — 63600175 PHARM REV CODE 636 W HCPCS: Performed by: NURSE PRACTITIONER

## 2024-07-24 PROCEDURE — 25000003 PHARM REV CODE 250: Performed by: NURSE PRACTITIONER

## 2024-07-24 PROCEDURE — 96361 HYDRATE IV INFUSION ADD-ON: CPT

## 2024-07-24 PROCEDURE — 96374 THER/PROPH/DIAG INJ IV PUSH: CPT

## 2024-07-24 PROCEDURE — U0002 COVID-19 LAB TEST NON-CDC: HCPCS | Performed by: EMERGENCY MEDICINE

## 2024-07-24 PROCEDURE — 81025 URINE PREGNANCY TEST: CPT | Performed by: NURSE PRACTITIONER

## 2024-07-24 RX ORDER — ONDANSETRON 4 MG/1
4 TABLET, FILM COATED ORAL EVERY 6 HOURS
Qty: 12 TABLET | Refills: 0 | Status: SHIPPED | OUTPATIENT
Start: 2024-07-24

## 2024-07-24 RX ORDER — ONDANSETRON HYDROCHLORIDE 2 MG/ML
4 INJECTION, SOLUTION INTRAVENOUS
Status: COMPLETED | OUTPATIENT
Start: 2024-07-24 | End: 2024-07-24

## 2024-07-24 RX ORDER — IBUPROFEN 800 MG/1
800 TABLET ORAL
Status: COMPLETED | OUTPATIENT
Start: 2024-07-24 | End: 2024-07-24

## 2024-07-24 RX ORDER — LOPERAMIDE HYDROCHLORIDE 2 MG/1
2 CAPSULE ORAL 4 TIMES DAILY PRN
Qty: 12 CAPSULE | Refills: 0 | Status: SHIPPED | OUTPATIENT
Start: 2024-07-24 | End: 2024-08-03

## 2024-07-24 RX ADMIN — ONDANSETRON 4 MG: 2 INJECTION INTRAMUSCULAR; INTRAVENOUS at 01:07

## 2024-07-24 RX ADMIN — SODIUM CHLORIDE 1000 ML: 0.9 INJECTION, SOLUTION INTRAVENOUS at 01:07

## 2024-07-24 RX ADMIN — IBUPROFEN 800 MG: 800 TABLET, FILM COATED ORAL at 01:07

## 2024-07-24 NOTE — ED PROVIDER NOTES
Encounter Date: 7/24/2024       History     Chief Complaint   Patient presents with    General Illness     Chief complaint vomiting, diarrhea  18-year-old female with a history of ADD anxiety aches presents to be evaluated for nausea vomiting diarrhea for the last 4 days.  She 4 episodes of nonbilious nonbloody vomiting and diarrhea per day.  She also reports cough and congestion.  She states she is able to tolerate water but is unable to tolerate foods.  She reports some mild generalized lower abdominal cramping.  Denies fever.  Does report body aches and chills.  States she was exposed to COVID last week.  She has been taking over-the-counter medications with minimal improvement in symptoms      Review of patient's allergies indicates:   Allergen Reactions    Iodine Swelling    Shellfish derived Swelling     Past Medical History:   Diagnosis Date    ADD (attention deficit disorder)     Allergy     seasonal    Anxiety     Eczema      Past Surgical History:   Procedure Laterality Date    ARTHROSCOPIC DEBRIDEMENT OF WRIST Right 4/26/2021    Procedure: ARTHROSCOPY, WRIST, WITH DEBRIDEMENT, right;  Surgeon: Vale Rich MD;  Location: [facility];  Service: Orthopedics;  Laterality: Right;  [facility]    ARTHROSCOPY OF KNEE Right 6/27/2018    Procedure: ARTHROSCOPY, KNEE;  Surgeon: Adrian Shannon MD;  Location: [address];  Service: Orthopedics;  Laterality: Right;    COLONOSCOPY N/A 1/22/2019    Procedure: COLONOSCOPY;  Surgeon: Miko Parmar MD;  Location: [facility];  Service: Endoscopy;  Laterality: N/A;    ESOPHAGOGASTRODUODENOSCOPY N/A 1/22/2019    Procedure: ESOPHAGOGASTRODUODENOSCOPY (EGD);  Surgeon: Miko Parmar MD;  Location: [facility];  Service: Endoscopy;  Laterality: N/A;    FEMUR OSTEOTOMY Right 2/27/2020    Procedure: OSTEOTOMY, FEMUR (RIGHT) - correction of genu valgum.  Orthopediatrics distal femur plate.  MTF Thorpe wedges.;  Surgeon: Adrian Shannon MD;  Location: [address];  Service:  Orthopedics;  Laterality: Right;  [facility] notified 2/21 MAL    OSTEOTOMY AND ULNAR SHORTENING Right 9/17/2021    Procedure: OSTEOTOMY, ULNA, FOR SHORTENING,RIGHT;  Surgeon: Vale Rich MD;  Location: [facility];  Service: Orthopedics;  Laterality: Right;  MAC/REGIONAL     REPAIR OF MENISCUS OF KNEE Right 6/27/2018    Procedure: REPAIR, MENISCUS, KNEE-- medial;  Surgeon: Adrian Shannon MD;  Location: [address];  Service: Orthopedics;  Laterality: Right;    TONSILLECTOMY, ADENOIDECTOMY      TYMPANOSTOMY TUBE PLACEMENT       Family History   Problem Relation Name Age of Onset    ADD / ADHD Mother      Asthma Mother      Diabetes Mother      Eczema Mother      Thyroid disease Maternal Grandmother      Diabetes Maternal Grandfather      No Known Problems Father      No Known Problems Sister      No Known Problems Brother      No Known Problems Maternal Aunt      No Known Problems Maternal Uncle      No Known Problems Paternal Aunt      No Known Problems Paternal Uncle      No Known Problems Paternal Grandmother      No Known Problems Paternal Grandfather      Alcohol abuse Neg Hx      Allergies Neg Hx      Autism spectrum disorder Neg Hx      Behavior problems Neg Hx      Birth defects Neg Hx      Cancer Neg Hx      Chromosomal disorder Neg Hx      Cleft lip Neg Hx      Congenital heart disease Neg Hx      Depression Neg Hx      Early death Neg Hx      Hearing loss Neg Hx      Heart disease Neg Hx      Hyperlipidemia Neg Hx      Hypertension Neg Hx      Kidney disease Neg Hx      Learning disabilities Neg Hx      Mental illness Neg Hx      Migraines Neg Hx      Neurodegenerative disease Neg Hx      Obesity Neg Hx      Seizures Neg Hx      SIDS Neg Hx      Other Neg Hx       Social History     Tobacco Use    Smoking status: Never    Smokeless tobacco: Never   Substance Use Topics    Alcohol use: No    Drug use: No     Review of Systems   Constitutional:  Positive for appetite change. Negative  for fatigue and fever.   HENT:  Positive for congestion, sinus pain and sore throat.    Respiratory:  Positive for cough. Negative for chest tightness.    Cardiovascular:  Negative for chest pain.   Gastrointestinal:  Positive for abdominal pain, diarrhea, nausea and vomiting.   Genitourinary:  Negative for difficulty urinating and dysuria.   Musculoskeletal:  Positive for arthralgias and myalgias.       Physical Exam     Initial Vitals [07/24/24 1311]   BP Pulse Resp Temp SpO2   120/78 97 18 98.4 °F (36.9 °C) 99 %      MAP       --         Physical Exam    Nursing note and vitals reviewed.  Constitutional: She appears well-developed and well-nourished.   HENT:   Head: Normocephalic and atraumatic.   Eyes: EOM are normal. Pupils are equal, round, and reactive to light.   Cardiovascular:  Normal rate and regular rhythm.           Pulmonary/Chest: Breath sounds normal. She has no wheezes. She has no rhonchi. She has no rales.   Abdominal: Abdomen is soft. She exhibits no distension. There is no abdominal tenderness. There is no rebound and no guarding.     Neurological: She is alert and oriented to person, place, and time.   Skin: Skin is warm and dry.   Psychiatric: She has a normal mood and affect. Thought content normal.         ED Course   Procedures  Labs Reviewed   COMPREHENSIVE METABOLIC PANEL - Abnormal       Result Value    Sodium 140      Potassium 4.0      Chloride 110      CO2 24      Glucose 107      BUN 9      Creatinine 0.7      Calcium 9.1      Total Protein 7.0      Albumin 4.0      Total Bilirubin 0.4      Alkaline Phosphatase 82      AST 15      ALT 20      eGFR >60      Anion Gap 6 (*)    CBC W/ AUTO DIFFERENTIAL - Abnormal    WBC 7.22      RBC 4.42      Hemoglobin 14.2      Hematocrit 41.6      MCV 94      MCH 32.1 (*)     MCHC 34.1      RDW 12.1      Platelets 293      MPV 8.3 (*)     Immature Granulocytes 0.4      Gran # (ANC) 4.3      Immature Grans (Abs) 0.03      Lymph # 2.2      Mono # 0.6       Eos # 0.2      Baso # 0.03      nRBC 0      Gran % 59.3      Lymph % 29.9      Mono % 7.8      Eosinophil % 2.2      Basophil % 0.4      Differential Method Automated     INFLUENZA A & B BY MOLECULAR    Influenza A, Molecular Negative      Influenza B, Molecular Negative      Flu A & B Source Nasal swab     SARS-COV-2 RNA AMPLIFICATION, QUAL    SARS-CoV-2 RNA, Amplification, Qual Negative     URINALYSIS, REFLEX TO URINE CULTURE    Specimen UA Urine, Clean Catch      Color, UA Yellow      Appearance, UA Clear      pH, UA 7.0      Specific Gravity, UA 1.015      Protein, UA Negative      Glucose, UA Negative      Ketones, UA Negative      Bilirubin (UA) Negative      Occult Blood UA Negative      Nitrite, UA Negative      Urobilinogen, UA Negative      Leukocytes, UA Negative      Narrative:     Specimen Source->Urine   PREGNANCY TEST, URINE RAPID    Preg Test, Ur Negative      Narrative:     Specimen Source->Urine   LIPASE   LIPASE    Lipase 24            Imaging Results    None          Medications   sodium chloride 0.9% bolus 1,000 mL 1,000 mL (0 mLs Intravenous Stopped 7/24/24 1434)   ondansetron injection 4 mg (4 mg Intravenous Given 7/24/24 1333)   ibuprofen tablet 800 mg (800 mg Oral Given 7/24/24 1338)     Medical Decision Making  18-year-old female presents to be evaluated for nausea vomiting diarrhea for the last he is.  Also reports some URI complaints.  Recently exposed to COVID  Different diagnoses include gastroenteritis, diarrhea, dehydration, electrolyte derangement, COVID, flu, viral URI, food-borne illness    Amount and/or Complexity of Data Reviewed  Labs: ordered.     Details: CBC, CMP, UA    Risk  Prescription drug management.  Risk Details: Benign abdominal exam and H today   No leukocytosis   No gross electrolyte derangement   Negative for COVID and flu  Given antiemetics and fluids in ED today   Patient p.o. challenged  Stable for DC with treatment for gastroenteritis and GI rest  Given  return precautions                                      Clinical Impression:  Final diagnoses:  [K52.9] Gastroenteritis (Primary)          ED Disposition Condition    Discharge Stable          ED Prescriptions       Medication Sig Dispense Start Date End Date Auth. Provider    loperamide (IMODIUM) 2 mg capsule Take 1 capsule (2 mg total) by mouth 4 (four) times daily as needed for Diarrhea. 12 capsule 7/24/2024 8/3/2024 Hermelinda Dorantes, NP    ondansetron (ZOFRAN) 4 MG tablet Take 1 tablet (4 mg total) by mouth every 6 (six) hours. 12 tablet 7/24/2024 -- Hermelinda Dorantes NP          Follow-up Information       Follow up With Specialties Details Why Contact Info    Aneta Low NP Family Medicine Schedule an appointment as soon as possible for a visit in 1 day  [address] [facility] [address]  [phone]               Hermelinda Dorantes NP  07/24/24 2818       Hermelinda Dorantes NP  07/24/24 6117

## 2024-07-24 NOTE — Clinical Note
"Chari Schaeferjoan Rod was seen and treated in our emergency department on 7/24/2024.  She may return to work on 07/26/2024.       If you have any questions or concerns, please don't hesitate to call.      Hermelinda Dorantes NP"

## 2024-07-24 NOTE — ED TRIAGE NOTES
18 y.o. female presents to ER Room/bed info not found   Chief Complaint   Patient presents with    General Illness   .   C/o vomiting, diarrhea, headache, and cough for a few days

## 2024-08-11 ENCOUNTER — HOSPITAL ENCOUNTER (EMERGENCY)
Facility: HOSPITAL | Age: 19
Discharge: HOME | End: 2024-08-11
Attending: SURGERY
Payer: MEDICAID

## 2024-08-11 VITALS
RESPIRATION RATE: 18 BRPM | DIASTOLIC BLOOD PRESSURE: 91 MMHG | HEIGHT: 65 IN | OXYGEN SATURATION: 98 % | SYSTOLIC BLOOD PRESSURE: 129 MMHG | TEMPERATURE: 99 F | HEART RATE: 94 BPM | WEIGHT: 208 LBS | BODY MASS INDEX: 34.66 KG/M2

## 2024-08-11 DIAGNOSIS — F41.9 ANXIETY: Primary | ICD-10-CM

## 2024-08-11 PROCEDURE — 99283 EMERGENCY DEPT VISIT LOW MDM: CPT

## 2024-08-11 RX ORDER — CLONAZEPAM 1 MG/1
1 TABLET ORAL 2 TIMES DAILY PRN
Qty: 14 TABLET | Refills: 0 | Status: SHIPPED | OUTPATIENT
Start: 2024-08-11 | End: 2024-08-18

## 2024-08-11 NOTE — ED PROVIDER NOTES
Encounter Date: 8/11/2024       History     Chief Complaint   Patient presents with    Panic Attack     18-year-old female with onset of panic attack after work today she states she has been under lot of stress and during exam started crying she is not suicidal homicidal she has been under the care of a physician who has prescribed her Lexapro for this condition      Mental Health Problem  The primary symptoms include dysphoric mood. The current episode started today. This is a recurrent problem.   The onset of the illness is precipitated by stressful event. The degree of incapacity that she is experiencing as a consequence of her illness is moderate. Additional symptoms of the illness include anhedonia and distractible. She does not admit to suicidal ideas. She does not have a plan to attempt suicide. She does not contemplate harming herself. She has not already injured self. She does not contemplate injuring another person. She has not already  injured another person.     Review of patient's allergies indicates:   Allergen Reactions    Iodine Swelling    Shellfish derived Swelling     Past Medical History:   Diagnosis Date    ADD (attention deficit disorder)     Allergy     seasonal    Anxiety     Eczema      Past Surgical History:   Procedure Laterality Date    ARTHROSCOPIC DEBRIDEMENT OF WRIST Right 4/26/2021    Procedure: ARTHROSCOPY, WRIST, WITH DEBRIDEMENT, right;  Surgeon: Vale Rich MD;  Location: [facility];  Service: Orthopedics;  Laterality: Right;  [facility]    ARTHROSCOPY OF KNEE Right 6/27/2018    Procedure: ARTHROSCOPY, KNEE;  Surgeon: Adrian Shannon MD;  Location: [address];  Service: Orthopedics;  Laterality: Right;    COLONOSCOPY N/A 1/22/2019    Procedure: COLONOSCOPY;  Surgeon: Miko Parmar MD;  [facility];  Service: Endoscopy;  Laterality: N/A;    ESOPHAGOGASTRODUODENOSCOPY N/A 1/22/2019    Procedure: ESOPHAGOGASTRODUODENOSCOPY (EGD);  Surgeon: Miko Parmar  MD;  Location: [facility];  Service: Endoscopy;  Laterality: N/A;    FEMUR OSTEOTOMY Right 2/27/2020    Procedure: OSTEOTOMY, FEMUR (RIGHT) - correction of genu valgum.  Orthopediatrics distal femur plate.  MTF Thorpe wedges.;  Surgeon: Adrian Shannon MD;  Location: [facility] 1ST FLR;  Service: Orthopedics;  Laterality: Right;  [facility] notified 2/21 [facility]    OSTEOTOMY AND ULNAR SHORTENING Right 9/17/2021    Procedure: OSTEOTOMY, ULNA, FOR SHORTENING,RIGHT;  Surgeon: Vale Rich MD;  Location: [facility];  Service: Orthopedics;  Laterality: Right;  MAC/REGIONAL     REPAIR OF MENISCUS OF KNEE Right 6/27/2018    Procedure: REPAIR, MENISCUS, KNEE-- medial;  Surgeon: Adrian Shannon MD;  Location: [facility] OR 2ND FLR;  Service: Orthopedics;  Laterality: Right;    TONSILLECTOMY, ADENOIDECTOMY      TYMPANOSTOMY TUBE PLACEMENT       Family History   Problem Relation Name Age of Onset    ADD / ADHD Mother      Asthma Mother      Diabetes Mother      Eczema Mother      Thyroid disease Maternal Grandmother      Diabetes Maternal Grandfather      No Known Problems Father      No Known Problems Sister      No Known Problems Brother      No Known Problems Maternal Aunt      No Known Problems Maternal Uncle      No Known Problems Paternal Aunt      No Known Problems Paternal Uncle      No Known Problems Paternal Grandmother      No Known Problems Paternal Grandfather      Alcohol abuse Neg Hx      Allergies Neg Hx      Autism spectrum disorder Neg Hx      Behavior problems Neg Hx      Birth defects Neg Hx      Cancer Neg Hx      Chromosomal disorder Neg Hx      Cleft lip Neg Hx      Congenital heart disease Neg Hx      Depression Neg Hx      Early death Neg Hx      Hearing loss Neg Hx      Heart disease Neg Hx      Hyperlipidemia Neg Hx      Hypertension Neg Hx      Kidney disease Neg Hx      Learning disabilities Neg Hx      Mental illness Neg Hx      Migraines Neg Hx       Neurodegenerative disease Neg Hx      Obesity Neg Hx      Seizures Neg Hx      SIDS Neg Hx      Other Neg Hx       Social History     Tobacco Use    Smoking status: Never    Smokeless tobacco: Never   Substance Use Topics    Alcohol use: No    Drug use: No     Review of Systems   Constitutional: Negative.    HENT: Negative.     Eyes: Negative.    Respiratory: Negative.     Cardiovascular: Negative.    Gastrointestinal: Negative.    Endocrine: Negative.    Genitourinary: Negative.    Allergic/Immunologic: Negative.    Neurological: Negative.    Psychiatric/Behavioral:  Positive for dysphoric mood.        Physical Exam     Initial Vitals [08/11/24 0706]   BP Pulse Resp Temp SpO2   (!) 129/91 94 18 99.2 °F (37.3 °C) 98 %      MAP       --         Physical Exam    Nursing note and vitals reviewed.  Constitutional: She appears well-developed and well-nourished.   HENT:   Head: Normocephalic.   Eyes: Conjunctivae are normal.   Neck:   Normal range of motion.  Cardiovascular:  Normal rate.           Pulmonary/Chest: Breath sounds normal.   Abdominal: Abdomen is soft.   Musculoskeletal:         General: Normal range of motion.      Cervical back: Normal range of motion.     Neurological: She is alert and oriented to person, place, and time. GCS score is 15. GCS eye subscore is 4. GCS verbal subscore is 5. GCS motor subscore is 6.   Skin: Skin is warm.   Psychiatric: She has a normal mood and affect.       ED Course   Procedures  Labs Reviewed - No data to display       Imaging Results    None          Medications - No data to display  Medical Decision Making  Patient has signs and symptoms of anxiety a psychiatric exam is normal except for anxiety she is not suicidal or homicidal she is not delusional she has no thought disorder she is on Lexapro recommend referral to a mental health outpatient facility located nearby and to give her Klonopin for 5 days until she makes the appointment she is return to the ED if her  anxiety gets more disabling    Risk  Prescription drug management.                                      Clinical Impression:  Final diagnoses:  [F41.9] Anxiety (Primary)          ED Disposition Condition    Discharge Stable          ED Prescriptions       Medication Sig Dispense Start Date End Date Auth. Provider    clonazePAM (KLONOPIN) 1 MG tablet Take 1 tablet (1 mg total) by mouth 2 (two) times daily as needed for Anxiety. 14 tablet 8/11/2024 8/18/2024 MICKEY Reynolds III, MD          Follow-up Information    None          MICKEY Reynolds III, MD  08/11/24 8999

## 2024-08-11 NOTE — ED NOTES
Patient given the number to behavior health for [facility]  to start seeing someone in outpatient setting

## 2024-08-11 NOTE — ED TRIAGE NOTES
Patient reports she feels like she is having a panic attack. Patient reports she feels like her heart is going to beat out of her chest and feels SOB. 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

## 2024-10-01 DIAGNOSIS — Z30.011 OCP (ORAL CONTRACEPTIVE PILLS) INITIATION: ICD-10-CM

## 2024-10-01 RX ORDER — LEVONORGESTREL AND ETHINYL ESTRADIOL 0.1-0.02MG
1 KIT ORAL
Qty: 28 TABLET | Refills: 0 | Status: SHIPPED | OUTPATIENT
Start: 2024-10-01

## 2024-10-09 ENCOUNTER — PATIENT MESSAGE (OUTPATIENT)
Dept: PSYCHIATRY | Facility: CLINIC | Age: 19
End: 2024-10-09
Payer: MEDICAID

## 2024-10-15 ENCOUNTER — HOSPITAL ENCOUNTER (EMERGENCY)
Facility: HOSPITAL | Age: 19
Discharge: HOME | End: 2024-10-15
Attending: SURGERY
Payer: MEDICAID

## 2024-10-15 VITALS
BODY MASS INDEX: 33.06 KG/M2 | HEART RATE: 97 BPM | HEIGHT: 65 IN | OXYGEN SATURATION: 99 % | SYSTOLIC BLOOD PRESSURE: 106 MMHG | WEIGHT: 198.44 LBS | DIASTOLIC BLOOD PRESSURE: 65 MMHG | TEMPERATURE: 99 F | RESPIRATION RATE: 18 BRPM

## 2024-10-15 DIAGNOSIS — N39.0 LOWER URINARY TRACT INFECTIOUS DISEASE: Primary | ICD-10-CM

## 2024-10-15 LAB
B-HCG UR QL: NEGATIVE
BILIRUB UR QL STRIP: NEGATIVE
CLARITY UR: ABNORMAL
COLOR UR: YELLOW
GLUCOSE UR QL STRIP: NEGATIVE
HGB UR QL STRIP: ABNORMAL
KETONES UR QL STRIP: NEGATIVE
LEUKOCYTE ESTERASE UR QL STRIP: ABNORMAL
MICROSCOPIC COMMENT: ABNORMAL
NITRITE UR QL STRIP: NEGATIVE
PH UR STRIP: 8 [PH] (ref 5–8)
PROT UR QL STRIP: NEGATIVE
RBC #/AREA URNS HPF: 1 /HPF (ref 0–4)
SP GR UR STRIP: 1.02 (ref 1–1.03)
URN SPEC COLLECT METH UR: ABNORMAL
UROBILINOGEN UR STRIP-ACNC: NEGATIVE EU/DL
WBC #/AREA URNS HPF: 30 /HPF (ref 0–5)
WBC CLUMPS URNS QL MICRO: ABNORMAL

## 2024-10-15 PROCEDURE — 96372 THER/PROPH/DIAG INJ SC/IM: CPT

## 2024-10-15 PROCEDURE — 63600175 PHARM REV CODE 636 W HCPCS

## 2024-10-15 PROCEDURE — 81025 URINE PREGNANCY TEST: CPT

## 2024-10-15 PROCEDURE — 87086 URINE CULTURE/COLONY COUNT: CPT

## 2024-10-15 PROCEDURE — 87088 URINE BACTERIA CULTURE: CPT

## 2024-10-15 PROCEDURE — 81000 URINALYSIS NONAUTO W/SCOPE: CPT

## 2024-10-15 PROCEDURE — 99284 EMERGENCY DEPT VISIT MOD MDM: CPT | Mod: 25

## 2024-10-15 RX ORDER — LIDOCAINE HYDROCHLORIDE 10 MG/ML
2.1 INJECTION, SOLUTION INFILTRATION; PERINEURAL
Status: COMPLETED | OUTPATIENT
Start: 2024-10-15 | End: 2024-10-15

## 2024-10-15 RX ORDER — CEFTRIAXONE 1 G/1
1 INJECTION, POWDER, FOR SOLUTION INTRAMUSCULAR; INTRAVENOUS
Status: COMPLETED | OUTPATIENT
Start: 2024-10-15 | End: 2024-10-15

## 2024-10-15 RX ORDER — ONDANSETRON 4 MG/1
4 TABLET, ORALLY DISINTEGRATING ORAL EVERY 8 HOURS PRN
Qty: 15 TABLET | Refills: 0 | Status: SHIPPED | OUTPATIENT
Start: 2024-10-15

## 2024-10-15 RX ORDER — CEFDINIR 300 MG/1
300 CAPSULE ORAL 2 TIMES DAILY
Qty: 16 CAPSULE | Refills: 0 | Status: SHIPPED | OUTPATIENT
Start: 2024-10-15 | End: 2024-10-23

## 2024-10-15 RX ADMIN — LIDOCAINE HYDROCHLORIDE 2.1 ML: 10 INJECTION, SOLUTION INFILTRATION; PERINEURAL at 12:10

## 2024-10-15 RX ADMIN — CEFTRIAXONE SODIUM 1 G: 1 INJECTION, POWDER, FOR SOLUTION INTRAMUSCULAR; INTRAVENOUS at 12:10

## 2024-10-15 NOTE — ED PROVIDER NOTES
Encounter Date: 10/15/2024       History     Chief Complaint   Patient presents with    Dysuria     This note is dictated on M*Modal word recognition program.  There are word recognition mistakes and grammatical errors that are occasionally missed on review.     Chari Rod is a 19 y.o. female presents to ER today with complaints of dysuria, nausea, frequent urination for roughly the past 3 weeks.  Patient is concerned she is pregnant.  Denies flank pain or fevers.  Patient reports she was currently not taking her birth control due to side effects of nausea and vomiting.  She is due to follow-up with gyn to discuss further birth control options.      The history is provided by the patient.     Review of patient's allergies indicates:   Allergen Reactions    Iodine Swelling    Shellfish derived Swelling     Past Medical History:   Diagnosis Date    ADD (attention deficit disorder)     Allergy     seasonal    Anxiety     Eczema      Past Surgical History:   Procedure Laterality Date    ARTHROSCOPIC DEBRIDEMENT OF WRIST Right 4/26/2021    Procedure: ARTHROSCOPY, WRIST, WITH DEBRIDEMENT, right;  Surgeon: Vale Rich MD;  Location: [facility];  Service: Orthopedics;  Laterality: Right;  Regional/[facility]    ARTHROSCOPY OF KNEE Right 6/27/2018    Procedure: ARTHROSCOPY, KNEE;  Surgeon: Adrian Shannon MD;  Location: [address];  Service: Orthopedics;  Laterality: Right;    COLONOSCOPY N/A 1/22/2019    Procedure: COLONOSCOPY;  Surgeon: Miko Parmar MD;  Location: [facility];  Service: Endoscopy;  Laterality: N/A;    ESOPHAGOGASTRODUODENOSCOPY N/A 1/22/2019    Procedure: ESOPHAGOGASTRODUODENOSCOPY (EGD);  Surgeon: Miko Parmar MD;  Location: [facility];  Service: Endoscopy;  Laterality: N/A;    FEMUR OSTEOTOMY Right 2/27/2020    Procedure: OSTEOTOMY, FEMUR (RIGHT) - correction of genu valgum.  Orthopediatrics distal femur plate.  MTF [place] wedges.;  Surgeon: Adrian Shannon MD;  [facility];   Service: Orthopedics;  Laterality: Right;  [facility] notified 2/21 MAL    OSTEOTOMY AND ULNAR SHORTENING Right 9/17/2021    Procedure: OSTEOTOMY, ULNA, FOR SHORTENING,RIGHT;  Surgeon: Vale Rich MD;  Location: [facility];  Service: Orthopedics;  Laterality: Right;  MAC/REGIONAL     REPAIR OF MENISCUS OF KNEE Right 6/27/2018    Procedure: REPAIR, MENISCUS, KNEE-- medial;  Surgeon: Adrian Shannon MD;  Location: [address];  Service: Orthopedics;  Laterality: Right;    TONSILLECTOMY, ADENOIDECTOMY      TYMPANOSTOMY TUBE PLACEMENT       Family History   Problem Relation Name Age of Onset    ADD / ADHD Mother      Asthma Mother      Diabetes Mother      Eczema Mother      Thyroid disease Maternal Grandmother      Diabetes Maternal Grandfather      No Known Problems Father      No Known Problems Sister      No Known Problems Brother      No Known Problems Maternal Aunt      No Known Problems Maternal Uncle      No Known Problems Paternal Aunt      No Known Problems Paternal Uncle      No Known Problems Paternal Grandmother      No Known Problems Paternal Grandfather      Alcohol abuse Neg Hx      Allergies Neg Hx      Autism spectrum disorder Neg Hx      Behavior problems Neg Hx      Birth defects Neg Hx      Cancer Neg Hx      Chromosomal disorder Neg Hx      Cleft lip Neg Hx      Congenital heart disease Neg Hx      Depression Neg Hx      Early death Neg Hx      Hearing loss Neg Hx      Heart disease Neg Hx      Hyperlipidemia Neg Hx      Hypertension Neg Hx      Kidney disease Neg Hx      Learning disabilities Neg Hx      Mental illness Neg Hx      Migraines Neg Hx      Neurodegenerative disease Neg Hx      Obesity Neg Hx      Seizures Neg Hx      SIDS Neg Hx      Other Neg Hx       Social History     Tobacco Use    Smoking status: Never    Smokeless tobacco: Never   Substance Use Topics    Alcohol use: No    Drug use: No     Review of Systems   Gastrointestinal:  Positive for nausea.    Genitourinary:  Positive for dysuria.       Physical Exam     Initial Vitals [10/15/24 1144]   BP Pulse Resp Temp SpO2   124/84 102 16 98.8 °F (37.1 °C) 99 %      MAP       --         Physical Exam    Constitutional: She appears well-developed and well-nourished.   HENT:   Head: Normocephalic.   Eyes: Pupils are equal, round, and reactive to light.   Neck: Neck supple.   Normal range of motion.  Cardiovascular:  Normal rate and regular rhythm.           No murmur heard.  Pulmonary/Chest: Breath sounds normal.   Abdominal: Abdomen is soft. Bowel sounds are normal. She exhibits no distension. There is no abdominal tenderness.   Musculoskeletal:         General: No tenderness or edema.      Cervical back: Normal range of motion and neck supple.     Neurological: She is alert and oriented to person, place, and time. GCS score is 15. GCS eye subscore is 4. GCS verbal subscore is 5. GCS motor subscore is 6.   Skin: Skin is warm. Capillary refill takes less than 2 seconds.         ED Course   Procedures  Labs Reviewed   URINALYSIS, REFLEX TO URINE CULTURE - Abnormal       Result Value    Specimen UA Urine, Clean Catch      Color, UA Yellow      Appearance, UA Hazy (*)     pH, UA 8.0      Specific Gravity, UA 1.020      Protein, UA Negative      Glucose, UA Negative      Ketones, UA Negative      Bilirubin (UA) Negative      Occult Blood UA Trace (*)     Nitrite, UA Negative      Urobilinogen, UA Negative      Leukocytes, UA Trace (*)     Narrative:     Specimen Source->Urine   URINALYSIS MICROSCOPIC - Abnormal    RBC, UA 1      WBC, UA 30 (*)     WBC Clumps, UA Few (*)     Microscopic Comment SEE COMMENT      Narrative:     Specimen Source->Urine   CULTURE, URINE   PREGNANCY TEST, URINE RAPID    Preg Test, Ur Negative      Narrative:     Specimen Source->Urine          Imaging Results    None          Medications   cefTRIAXone injection 1 g (1 g Intramuscular Given 10/15/24 1228)   LIDOcaine HCL 10 mg/ml (1%) injection  2.1 mL (2.1 mLs Intramuscular Given 10/15/24 1362)     Medical Decision Making  Differential diagnosis includes urinary tract infection, pregnancy pyelonephritis, acute cystitis    Urine pregnancy test negative.  Will treat nausea with Zofran.  Urinalysis has hazy appearance 30+ white blood cells few white blood cell clumps  Will treat patient with Rocephin injection today followed by Omnicef  Patient advised to follow-up with PCP.  Patient does not appear to be uroseptic.  Patient stable at time of discharge in no acute distress.  No life-threatening illnesses were found during ER visit today.  Patient was instructed to follow-up with PCP or other recommended specialist within the next 48-72 hours.  Patient was instructed to return to ER immediately for any worsening or concerning symptoms.  All discharge instructions discussed with patient, and patient agrees to comply with discharge instructions given today.     Risk  Prescription drug management.                                      Clinical Impression:  Final diagnoses:  [N39.0] Lower urinary tract infectious disease (Primary)          ED Disposition Condition    Discharge Stable          ED Prescriptions       Medication Sig Dispense Start Date End Date Auth. Provider    cefdinir (OMNICEF) 300 MG capsule Take 1 capsule (300 mg total) by mouth 2 (two) times daily. for 8 days 16 capsule 10/15/2024 10/23/2024 Magdaleno Quinones NP    ondansetron (ZOFRAN-ODT) 4 MG TbDL Take 1 tablet (4 mg total) by mouth every 8 (eight) hours as needed (nausea/vomiting). 15 tablet 10/15/2024 -- Magdaleno Quinones NP          Follow-up Information       Follow up With Specialties Details Why Contact Info    Aneta Low NP Family Medicine Schedule an appointment as soon as possible for a visit in 3 days  [address] [facility] [address]  [phone]               Magdaleno Quinones NP  10/15/24 1228       Magdaleno Quinones NP  10/15/24 1257

## 2024-10-17 LAB — BACTERIA UR CULT: ABNORMAL

## 2024-11-24 ENCOUNTER — HOSPITAL ENCOUNTER (EMERGENCY)
Facility: HOSPITAL | Age: 19
Discharge: HOME | End: 2024-11-24
Attending: STUDENT IN AN ORGANIZED HEALTH CARE EDUCATION/TRAINING PROGRAM
Payer: MEDICAID

## 2024-11-24 VITALS
DIASTOLIC BLOOD PRESSURE: 71 MMHG | TEMPERATURE: 100 F | HEART RATE: 104 BPM | SYSTOLIC BLOOD PRESSURE: 121 MMHG | BODY MASS INDEX: 33.42 KG/M2 | WEIGHT: 200.81 LBS | RESPIRATION RATE: 18 BRPM | OXYGEN SATURATION: 96 %

## 2024-11-24 DIAGNOSIS — R00.0 TACHYCARDIA: ICD-10-CM

## 2024-11-24 DIAGNOSIS — N30.00 ACUTE CYSTITIS WITHOUT HEMATURIA: Primary | ICD-10-CM

## 2024-11-24 LAB
ALBUMIN SERPL BCP-MCNC: 4.1 G/DL (ref 3.5–5.2)
ALP SERPL-CCNC: 121 U/L (ref 40–150)
ALT SERPL W/O P-5'-P-CCNC: 116 U/L (ref 10–44)
ANION GAP SERPL CALC-SCNC: 12 MMOL/L (ref 8–16)
AST SERPL-CCNC: 95 U/L (ref 10–40)
B-HCG UR QL: NEGATIVE
BACTERIA #/AREA URNS HPF: ABNORMAL /HPF
BASOPHILS # BLD AUTO: 0.03 K/UL (ref 0–0.2)
BASOPHILS NFR BLD: 0.2 % (ref 0–1.9)
BILIRUB SERPL-MCNC: 0.9 MG/DL (ref 0.1–1)
BILIRUB UR QL STRIP: NEGATIVE
BUN SERPL-MCNC: 8 MG/DL (ref 6–20)
CALCIUM SERPL-MCNC: 9.6 MG/DL (ref 8.7–10.5)
CHLORIDE SERPL-SCNC: 109 MMOL/L (ref 95–110)
CLARITY UR: CLEAR
CO2 SERPL-SCNC: 19 MMOL/L (ref 23–29)
COLOR UR: YELLOW
CREAT SERPL-MCNC: 0.7 MG/DL (ref 0.5–1.4)
DIFFERENTIAL METHOD BLD: ABNORMAL
EOSINOPHIL # BLD AUTO: 0.1 K/UL (ref 0–0.5)
EOSINOPHIL NFR BLD: 0.6 % (ref 0–8)
ERYTHROCYTE [DISTWIDTH] IN BLOOD BY AUTOMATED COUNT: 12 % (ref 11.5–14.5)
EST. GFR  (NO RACE VARIABLE): >60 ML/MIN/1.73 M^2
GLUCOSE SERPL-MCNC: 108 MG/DL (ref 70–110)
GLUCOSE UR QL STRIP: NEGATIVE
GROUP A STREP, MOLECULAR: NEGATIVE
HCT VFR BLD AUTO: 39.4 % (ref 37–48.5)
HGB BLD-MCNC: 13.7 G/DL (ref 12–16)
HGB UR QL STRIP: ABNORMAL
HYALINE CASTS #/AREA URNS LPF: 1 /LPF
IMM GRANULOCYTES # BLD AUTO: 0.07 K/UL (ref 0–0.04)
IMM GRANULOCYTES NFR BLD AUTO: 0.4 % (ref 0–0.5)
INFLUENZA A, MOLECULAR: NEGATIVE
INFLUENZA B, MOLECULAR: NEGATIVE
KETONES UR QL STRIP: NEGATIVE
LACTATE SERPL-SCNC: 1.3 MMOL/L (ref 0.5–2.2)
LEUKOCYTE ESTERASE UR QL STRIP: ABNORMAL
LYMPHOCYTES # BLD AUTO: 0.7 K/UL (ref 1–4.8)
LYMPHOCYTES NFR BLD: 4.4 % (ref 18–48)
MCH RBC QN AUTO: 31.9 PG (ref 27–31)
MCHC RBC AUTO-ENTMCNC: 34.8 G/DL (ref 32–36)
MCV RBC AUTO: 92 FL (ref 82–98)
MICROSCOPIC COMMENT: ABNORMAL
MONOCYTES # BLD AUTO: 1 K/UL (ref 0.3–1)
MONOCYTES NFR BLD: 6.1 % (ref 4–15)
NEUTROPHILS # BLD AUTO: 13.9 K/UL (ref 1.8–7.7)
NEUTROPHILS NFR BLD: 88.3 % (ref 38–73)
NITRITE UR QL STRIP: NEGATIVE
NRBC BLD-RTO: 0 /100 WBC
PH UR STRIP: 7 [PH] (ref 5–8)
PLATELET # BLD AUTO: 304 K/UL (ref 150–450)
PMV BLD AUTO: 8.6 FL (ref 9.2–12.9)
POTASSIUM SERPL-SCNC: 3.9 MMOL/L (ref 3.5–5.1)
PROT SERPL-MCNC: 7.8 G/DL (ref 6–8.4)
PROT UR QL STRIP: ABNORMAL
RBC # BLD AUTO: 4.3 M/UL (ref 4–5.4)
RBC #/AREA URNS HPF: 3 /HPF (ref 0–4)
SARS-COV-2 RDRP RESP QL NAA+PROBE: NEGATIVE
SODIUM SERPL-SCNC: 140 MMOL/L (ref 136–145)
SP GR UR STRIP: 1.02 (ref 1–1.03)
SPECIMEN SOURCE: NORMAL
SQUAMOUS #/AREA URNS HPF: 1 /HPF
URN SPEC COLLECT METH UR: ABNORMAL
UROBILINOGEN UR STRIP-ACNC: ABNORMAL EU/DL
WBC # BLD AUTO: 15.7 K/UL (ref 3.9–12.7)
WBC #/AREA URNS HPF: 55 /HPF (ref 0–5)

## 2024-11-24 PROCEDURE — 87086 URINE CULTURE/COLONY COUNT: CPT | Performed by: NURSE PRACTITIONER

## 2024-11-24 PROCEDURE — 96365 THER/PROPH/DIAG IV INF INIT: CPT

## 2024-11-24 PROCEDURE — 99285 EMERGENCY DEPT VISIT HI MDM: CPT | Mod: 25

## 2024-11-24 PROCEDURE — 93010 ELECTROCARDIOGRAM REPORT: CPT | Mod: ,,, | Performed by: INTERNAL MEDICINE

## 2024-11-24 PROCEDURE — 25000003 PHARM REV CODE 250: Performed by: NURSE PRACTITIONER

## 2024-11-24 PROCEDURE — 81025 URINE PREGNANCY TEST: CPT | Performed by: NURSE PRACTITIONER

## 2024-11-24 PROCEDURE — 87186 SC STD MICRODIL/AGAR DIL: CPT | Performed by: NURSE PRACTITIONER

## 2024-11-24 PROCEDURE — 87635 SARS-COV-2 COVID-19 AMP PRB: CPT | Performed by: NURSE PRACTITIONER

## 2024-11-24 PROCEDURE — 87502 INFLUENZA DNA AMP PROBE: CPT | Performed by: NURSE PRACTITIONER

## 2024-11-24 PROCEDURE — 87088 URINE BACTERIA CULTURE: CPT | Performed by: NURSE PRACTITIONER

## 2024-11-24 PROCEDURE — 87651 STREP A DNA AMP PROBE: CPT | Performed by: NURSE PRACTITIONER

## 2024-11-24 PROCEDURE — 85025 COMPLETE CBC W/AUTO DIFF WBC: CPT | Performed by: NURSE PRACTITIONER

## 2024-11-24 PROCEDURE — 96361 HYDRATE IV INFUSION ADD-ON: CPT

## 2024-11-24 PROCEDURE — 80053 COMPREHEN METABOLIC PANEL: CPT | Performed by: NURSE PRACTITIONER

## 2024-11-24 PROCEDURE — 83605 ASSAY OF LACTIC ACID: CPT | Performed by: NURSE PRACTITIONER

## 2024-11-24 PROCEDURE — 96375 TX/PRO/DX INJ NEW DRUG ADDON: CPT

## 2024-11-24 PROCEDURE — 93005 ELECTROCARDIOGRAM TRACING: CPT

## 2024-11-24 PROCEDURE — 81000 URINALYSIS NONAUTO W/SCOPE: CPT | Performed by: NURSE PRACTITIONER

## 2024-11-24 PROCEDURE — 99900035 HC TECH TIME PER 15 MIN (STAT)

## 2024-11-24 PROCEDURE — 63600175 PHARM REV CODE 636 W HCPCS: Performed by: NURSE PRACTITIONER

## 2024-11-24 PROCEDURE — 87040 BLOOD CULTURE FOR BACTERIA: CPT | Mod: 59 | Performed by: NURSE PRACTITIONER

## 2024-11-24 RX ORDER — NAPROXEN 500 MG/1
500 TABLET ORAL EVERY 12 HOURS PRN
Qty: 10 TABLET | Refills: 0 | Status: SHIPPED | OUTPATIENT
Start: 2024-11-24

## 2024-11-24 RX ORDER — ACETAMINOPHEN 500 MG
1000 TABLET ORAL
Status: COMPLETED | OUTPATIENT
Start: 2024-11-24 | End: 2024-11-24

## 2024-11-24 RX ORDER — ONDANSETRON HYDROCHLORIDE 2 MG/ML
4 INJECTION, SOLUTION INTRAVENOUS
Status: COMPLETED | OUTPATIENT
Start: 2024-11-24 | End: 2024-11-24

## 2024-11-24 RX ORDER — SODIUM CHLORIDE 9 MG/ML
1000 INJECTION, SOLUTION INTRAVENOUS
Status: COMPLETED | OUTPATIENT
Start: 2024-11-24 | End: 2024-11-24

## 2024-11-24 RX ORDER — KETOROLAC TROMETHAMINE 30 MG/ML
15 INJECTION, SOLUTION INTRAMUSCULAR; INTRAVENOUS
Status: COMPLETED | OUTPATIENT
Start: 2024-11-24 | End: 2024-11-24

## 2024-11-24 RX ORDER — CEFDINIR 300 MG/1
300 CAPSULE ORAL 2 TIMES DAILY
Qty: 20 CAPSULE | Refills: 0 | Status: SHIPPED | OUTPATIENT
Start: 2024-11-24 | End: 2024-12-04

## 2024-11-24 RX ORDER — CEFTRIAXONE 2 G/1
2 INJECTION, POWDER, FOR SOLUTION INTRAMUSCULAR; INTRAVENOUS
Status: COMPLETED | OUTPATIENT
Start: 2024-11-24 | End: 2024-11-24

## 2024-11-24 RX ADMIN — SODIUM CHLORIDE 1000 ML: 0.9 INJECTION, SOLUTION INTRAVENOUS at 11:11

## 2024-11-24 RX ADMIN — ONDANSETRON 4 MG: 2 INJECTION INTRAMUSCULAR; INTRAVENOUS at 11:11

## 2024-11-24 RX ADMIN — ACETAMINOPHEN 1000 MG: 500 TABLET ORAL at 11:11

## 2024-11-24 RX ADMIN — KETOROLAC TROMETHAMINE 15 MG: 30 INJECTION, SOLUTION INTRAMUSCULAR; INTRAVENOUS at 12:11

## 2024-11-24 RX ADMIN — CEFTRIAXONE SODIUM 2 G: 2 INJECTION, POWDER, FOR SOLUTION INTRAMUSCULAR; INTRAVENOUS at 12:11

## 2024-11-24 RX ADMIN — PROMETHAZINE HYDROCHLORIDE 12.5 MG: 25 INJECTION INTRAMUSCULAR; INTRAVENOUS at 12:11

## 2024-11-24 NOTE — ED PROVIDER NOTES
Encounter Date: 11/24/2024       History     Chief Complaint   Patient presents with    Vomiting     Pt c/o vomiting and chills that started today.     Chari Rod is a 19 y.o. female with PMH of ADD, anxiety, seasonal allergies presenting to the ED for evaluation of fever, generalized body aches, lower back pain, and vomiting.  Patient reports that she initially developed pain in her R lower back 2 days ago that has since progressed to L side. She began feeling feverish with chills yesterday in addition to having little to no appetite. This morning, she is feeling worse and began vomiting. Emesis is NB/NB. She also endorses lower abdominal pain that is mild, currently 2/10 in severity.  She denies UTI symptoms, vaginal discharge, or concern for STD.  She notes that she has also been congested and coughing; denies known exposure to sick contacts.     The history is provided by the patient.     Review of patient's allergies indicates:   Allergen Reactions    Iodine Swelling    Shellfish derived Swelling     Past Medical History:   Diagnosis Date    ADD (attention deficit disorder)     Allergy     seasonal    Anxiety     Eczema      Past Surgical History:   Procedure Laterality Date    ARTHROSCOPIC DEBRIDEMENT OF WRIST Right 4/26/2021    Procedure: ARTHROSCOPY, WRIST, WITH DEBRIDEMENT, right;  Surgeon: Vale Rich MD;  Location: [facility] OR;  Service: Orthopedics;  Laterality: Right;  Regional/MAC    ARTHROSCOPY OF KNEE Right 6/27/2018    Procedure: ARTHROSCOPY, KNEE;  Surgeon: Adrian Shannon MD;  Location: [address];  Service: Orthopedics;  Laterality: Right;    COLONOSCOPY N/A 1/22/2019    Procedure: COLONOSCOPY;  Surgeon: Miko Parmar MD;  Location: [facility];  Service: Endoscopy;  Laterality: N/A;    ESOPHAGOGASTRODUODENOSCOPY N/A 1/22/2019    Procedure: ESOPHAGOGASTRODUODENOSCOPY (EGD);  Surgeon: Miko Parmar MD;  Location: [facility];  Service: Endoscopy;  Laterality: N/A;    FEMUR OSTEOTOMY  Right 2/27/2020    Procedure: OSTEOTOMY, FEMUR (RIGHT) - correction of genu valgum.  Orthopediatrics distal femur plate.  MTF Thorpe wedges.;  Surgeon: Adrian Shannon MD;  Location: [facility] OR 1ST FLR;  Service: Orthopedics;  Laterality: Right;  [facility] notified 2/21 MAL    OSTEOTOMY AND ULNAR SHORTENING Right 9/17/2021    Procedure: OSTEOTOMY, ULNA, FOR SHORTENING,RIGHT;  Surgeon: Vale Rich MD;  Location: [facility];  Service: Orthopedics;  Laterality: Right;  MAC/REGIONAL     REPAIR OF MENISCUS OF KNEE Right 6/27/2018    Procedure: REPAIR, MENISCUS, KNEE-- medial;  Surgeon: Adrian Shannon MD;  Location: [facility] OR 2ND FLR;  Service: Orthopedics;  Laterality: Right;    TONSILLECTOMY, ADENOIDECTOMY      TYMPANOSTOMY TUBE PLACEMENT       Family History   Problem Relation Name Age of Onset    ADD / ADHD Mother      Asthma Mother      Diabetes Mother      Eczema Mother      Thyroid disease Maternal Grandmother      Diabetes Maternal Grandfather      No Known Problems Father      No Known Problems Sister      No Known Problems Brother      No Known Problems Maternal Aunt      No Known Problems Maternal Uncle      No Known Problems Paternal Aunt      No Known Problems Paternal Uncle      No Known Problems Paternal Grandmother      No Known Problems Paternal Grandfather      Alcohol abuse Neg Hx      Allergies Neg Hx      Autism spectrum disorder Neg Hx      Behavior problems Neg Hx      Birth defects Neg Hx      Cancer Neg Hx      Chromosomal disorder Neg Hx      Cleft lip Neg Hx      Congenital heart disease Neg Hx      Depression Neg Hx      Early death Neg Hx      Hearing loss Neg Hx      Heart disease Neg Hx      Hyperlipidemia Neg Hx      Hypertension Neg Hx      Kidney disease Neg Hx      Learning disabilities Neg Hx      Mental illness Neg Hx      Migraines Neg Hx      Neurodegenerative disease Neg Hx      Obesity Neg Hx      Seizures Neg Hx      SIDS Neg Hx      Other Neg Hx       Social  History     Tobacco Use    Smoking status: Never    Smokeless tobacco: Never   Substance Use Topics    Alcohol use: No    Drug use: No     Review of Systems   Constitutional:  Positive for activity change, appetite change, chills and fever.   HENT:  Positive for congestion. Negative for ear discharge, ear pain, postnasal drip, sinus pressure, sinus pain and sore throat.    Respiratory:  Positive for cough. Negative for chest tightness and shortness of breath.    Cardiovascular:  Negative for chest pain.   Gastrointestinal:  Positive for abdominal pain, nausea and vomiting. Negative for abdominal distention.   Genitourinary:  Negative for dysuria, frequency and urgency.   Musculoskeletal:  Positive for back pain (bilateral).   Skin:  Negative for rash.   Neurological:  Negative for dizziness, weakness, light-headedness and numbness.   Hematological:  Does not bruise/bleed easily.       Physical Exam     Initial Vitals [11/24/24 1024]   BP Pulse Resp Temp SpO2   131/78 (!) 152 20 100.2 °F (37.9 °C) 99 %      MAP       --         Physical Exam    Nursing note and vitals reviewed.  Constitutional: She appears well-developed and well-nourished.   HENT:   Head: Normocephalic and atraumatic.   Eyes: Conjunctivae and EOM are normal. Pupils are equal, round, and reactive to light.   Neck: Neck supple.   Cardiovascular:  Regular rhythm, normal heart sounds and intact distal pulses.   Tachycardia present.         Pulmonary/Chest: Breath sounds normal.   Abdominal: Abdomen is soft. Bowel sounds are normal. There is abdominal tenderness in the right lower quadrant, suprapubic area and left lower quadrant.   There is right CVA tenderness.  There is left CVA tenderness.   Musculoskeletal:         General: Normal range of motion.      Cervical back: Neck supple.     Neurological: She is alert and oriented to person, place, and time. She has normal strength.   Skin: Skin is warm and dry.   Psychiatric: She has a normal mood and  affect. Her behavior is normal. Judgment and thought content normal.         ED Course   Procedures  Labs Reviewed   CBC W/ AUTO DIFFERENTIAL - Abnormal       Result Value    WBC 15.70 (*)     RBC 4.30      Hemoglobin 13.7      Hematocrit 39.4      MCV 92      MCH 31.9 (*)     MCHC 34.8      RDW 12.0      Platelets 304      MPV 8.6 (*)     Immature Granulocytes 0.4      Gran # (ANC) 13.9 (*)     Immature Grans (Abs) 0.07 (*)     Lymph # 0.7 (*)     Mono # 1.0      Eos # 0.1      Baso # 0.03      nRBC 0      Gran % 88.3 (*)     Lymph % 4.4 (*)     Mono % 6.1      Eosinophil % 0.6      Basophil % 0.2      Differential Method Automated     COMPREHENSIVE METABOLIC PANEL - Abnormal    Sodium 140      Potassium 3.9      Chloride 109      CO2 19 (*)     Glucose 108      BUN 8      Creatinine 0.7      Calcium 9.6      Total Protein 7.8      Albumin 4.1      Total Bilirubin 0.9      Alkaline Phosphatase 121      AST 95 (*)      (*)     eGFR >60      Anion Gap 12     URINALYSIS, REFLEX TO URINE CULTURE - Abnormal    Specimen UA Urine, Clean Catch      Color, UA Yellow      Appearance, UA Clear      pH, UA 7.0      Specific Gravity, UA 1.020      Protein, UA 1+ (*)     Glucose, UA Negative      Ketones, UA Negative      Bilirubin (UA) Negative      Occult Blood UA Trace (*)     Nitrite, UA Negative      Urobilinogen, UA 2.0-3.0 (*)     Leukocytes, UA 1+ (*)     Narrative:     Specimen Source->Urine   URINALYSIS MICROSCOPIC - Abnormal    RBC, UA 3      WBC, UA 55 (*)     Bacteria Few (*)     Squam Epithel, UA 1      Hyaline Casts, UA 1      Microscopic Comment SEE COMMENT      Narrative:     Specimen Source->Urine   INFLUENZA A & B BY MOLECULAR    Influenza A, Molecular Negative      Influenza B, Molecular Negative      Flu A & B Source NP     GROUP A STREP, MOLECULAR    Group A Strep, Molecular Negative     CULTURE, BLOOD   CULTURE, BLOOD   CULTURE, URINE   SARS-COV-2 RNA AMPLIFICATION, QUAL    SARS-CoV-2 RNA,  Amplification, Qual Negative     PREGNANCY TEST, URINE RAPID    Preg Test, Ur Negative      Narrative:     Specimen Source->Urine   LACTIC ACID, PLASMA    Lactate (Lactic Acid) 1.3       EKG Readings: (Independently Interpreted)   Initial Reading: No STEMI. Previous EKG: Compared with most recent EKG Previous EKG Date: 05/16/24. Rhythm: Sinus Tachycardia. Heart Rate: 147. Ectopy: No Ectopy. Conduction: Normal.       Imaging Results              X-Ray Chest AP Portable (Final result)  Result time 11/24/24 13:04:07      Final result by Jerardo Forrester MD (11/24/24 13:04:07)                   Narrative:    EXAMINATION:  XR CHEST AP PORTABLE    CLINICAL HISTORY:  Sepsis;    TECHNIQUE:  Single frontal view of the chest was performed.    COMPARISON:  02/21/2024    FINDINGS:  Lungs are clear.Normal cardiomediastinal silhouette.Normal pulmonary vascular distribution.No pleural effusion or pneumothorax.No acute osseous abnormality.      Electronically signed by: Jerardo Forrester  Date:    11/24/2024  Time:    13:04                                     CT Renal Stone Study ABD Pelvis WO (Final result)  Result time 11/24/24 13:03:50      Final result by Jerardo Forrester MD (11/24/24 13:03:50)                   Impression:      No obstructive uropathy or calcified  stone.      Electronically signed by: Jerardo Forrester  Date:    11/24/2024  Time:    13:03               Narrative:    EXAMINATION:  CT RENAL STONE STUDY ABD PELVIS WO    CLINICAL HISTORY:  Flank pain, kidney stone suspected;    TECHNIQUE:  Low dose axial images, sagittal and coronal reformations were obtained from the lung bases to the pubic symphysis.  Contrast was not administered.    COMPARISON:  08/30/2023    FINDINGS:  Lung bases clear.  Normal size heart.  Unremarkable gallbladder.    No hydroureteronephrosis or calcified nephroureterolithiasis.  Remaining solid abdominal organs are grossly unremarkable on this noncontrast exam.    There is no enteric  contrast which limits bowel assessment.  No dilated bowel loops.    No focal abdominal fluid collection.  Umbilical skin piercing.  Unremarkable noncontrast uterus and urinary bladder.    No acute osseous abnormality.                                       Medications   acetaminophen tablet 1,000 mg (1,000 mg Oral Given 11/24/24 1102)   0.9% NaCl infusion (0 mLs Intravenous Stopped 11/24/24 1205)   ondansetron injection 4 mg (4 mg Intravenous Given 11/24/24 1102)   cefTRIAXone injection 2 g (2 g Intravenous Given 11/24/24 1202)   ketorolac injection 15 mg (15 mg Intravenous Given 11/24/24 1201)   promethazine (PHENERGAN) 12.5 mg in 0.9% NaCl 50 mL IVPB (0 mg Intravenous Stopped 11/24/24 1227)     Medical Decision Making  Evaluation of a 19-year-old female presenting with fever, chills, vomiting, and lower back pain.  Patient presents tachycardic, heart rate 152.  She is normotensive.  Temperature of 100.2° F  Physical exam + bilateral CVA tenderness and mild suprapubic tenderness    Differential diagnosis includes flu, COVID, UTI, kidney stone, pyelonephritis, dehydration    Amount and/or Complexity of Data Reviewed  Labs: ordered. Decision-making details documented in ED Course.  Radiology: ordered. Decision-making details documented in ED Course.  ECG/medicine tests: ordered and independent interpretation performed.     Details: Sinus tachycardia, rate 147.  Normal WV and QRS intervals.  No STEMI.  No significant change when compared to EKG of May 2024    Risk  OTC drugs.  Prescription drug management.  Risk Details: Stable for discharge home.  Lab workup with WBC 15.7.  CMP with mild transaminitis.  Negative lactic.  UA with 55 WBCs.  Negative nitrite.  Patient also with upper respiratory symptoms, chest x-ray completed that shows no pneumonia.  She was given IV fluids in addition to Tylenol with improvement in both heart rate and temperature.  CT stone study completed to rule out kidney stone/pyelo that shows no  acute findings.  She was given 2 g of IV Rocephin.  She is feeling much better in his tolerating p.o. intake without vomiting.  Blood cultures are pending. Feeling better to go home. Will dc home with strict return precautions.  Cefdinir prescribed on discharge.  I informed patient that if blood culture results positive she will be informed return to the ED. Patient/caregiver voices understanding and feels comfortable with discharge plan.      The patient acknowledges that close follow up with medical provider is required. Instructed to follow up with PCP within 2 days. Patient was given specific return precautions. The patient agrees to comply with all instruction and directions given in the ER.                                        Clinical Impression:  Final diagnoses:  [R00.0] Tachycardia  [N30.00] Acute cystitis without hematuria (Primary)          ED Disposition Condition    Discharge Stable          ED Prescriptions       Medication Sig Dispense Start Date End Date Auth. Provider    cefdinir (OMNICEF) 300 MG capsule Take 1 capsule (300 mg total) by mouth 2 (two) times daily. for 10 days 20 capsule 11/24/2024 12/4/2024 Dipika Cuevas NP    naproxen (NAPROSYN) 500 MG tablet Take 1 tablet (500 mg total) by mouth every 12 (twelve) hours as needed (pain). Take with food. 10 tablet 11/24/2024 -- Dipika Cuevas NP          Follow-up Information       Follow up With Specialties Details Why Contact Info    Aneta Low NP Family Medicine Schedule an appointment as soon as possible for a visit in 2 days  [address] [facility] [address]  [phone]               Dipika Cuevas NP  11/24/24 9117

## 2024-11-25 LAB
OHS QRS DURATION: 72 MS
OHS QTC CALCULATION: 525 MS

## 2024-11-29 LAB
BACTERIA BLD CULT: NORMAL
BACTERIA BLD CULT: NORMAL
BACTERIA UR CULT: ABNORMAL

## 2024-12-06 ENCOUNTER — PATIENT MESSAGE (OUTPATIENT)
Dept: ADMINISTRATIVE | Facility: HOSPITAL | Age: 19
End: 2024-12-06
Payer: MEDICAID

## 2025-02-02 ENCOUNTER — HOSPITAL ENCOUNTER (EMERGENCY)
Facility: HOSPITAL | Age: 20
Discharge: HOME | End: 2025-02-02
Attending: EMERGENCY MEDICINE
Payer: MEDICAID

## 2025-02-02 VITALS
WEIGHT: 198 LBS | SYSTOLIC BLOOD PRESSURE: 137 MMHG | HEART RATE: 100 BPM | BODY MASS INDEX: 32.95 KG/M2 | RESPIRATION RATE: 20 BRPM | OXYGEN SATURATION: 100 % | DIASTOLIC BLOOD PRESSURE: 79 MMHG | TEMPERATURE: 98 F

## 2025-02-02 DIAGNOSIS — Z93.3 STATUS POST COLOSTOMY: Primary | ICD-10-CM

## 2025-02-02 PROCEDURE — 99281 EMR DPT VST MAYX REQ PHY/QHP: CPT

## 2025-02-02 NOTE — ED PROVIDER NOTES
Encounter Date: 2/2/2025       History     Chief Complaint   Patient presents with    clostomy problem     Pt has colostomy LUQ and ran out of bags. Pt has ostomy covered with a towel. Needs supplies.     19-year-old female presents to the emergency department for an ostomy bag.  States that she does not have the right kind of bag at home.  States that she has had leaking from her previous bag and requests a different kind. She is very angry because she has been waiting in the waiting room with sick people while her stoma was exposed. There is a hospital in [place] that has the appropriate bags. She did not want to drive all the way over there to obtain the bag, so presented to this hospital instead. She has no other complaints.       Review of patient's allergies indicates:   Allergen Reactions    Iodine Swelling    Shellfish derived Swelling     Past Medical History:   Diagnosis Date    ADD (attention deficit disorder)     Allergy     seasonal    Anxiety     Eczema      Past Surgical History:   Procedure Laterality Date    ARTHROSCOPIC DEBRIDEMENT OF WRIST Right 4/26/2021    Procedure: ARTHROSCOPY, WRIST, WITH DEBRIDEMENT, right;  Surgeon: Vale Rich MD;  Location: [facility];  Service: Orthopedics;  Laterality: Right;  [facility]ional/[facility]    ARTHROSCOPY OF KNEE Right 6/27/2018    Procedure: ARTHROSCOPY, KNEE;  Surgeon: Adrian Shannon MD;  Location: [address];  Service: Orthopedics;  Laterality: Right;    COLONOSCOPY N/A 1/22/2019    Procedure: COLONOSCOPY;  Surgeon: Miko Parmar MD;  Location: [facility];  Service: Endoscopy;  Laterality: N/A;    ESOPHAGOGASTRODUODENOSCOPY N/A 1/22/2019    Procedure: ESOPHAGOGASTRODUODENOSCOPY (EGD);  Surgeon: Miko Parmar MD;  Location: [facility];  Service: Endoscopy;  Laterality: N/A;    FEMUR OSTEOTOMY Right 2/27/2020    Procedure: OSTEOTOMY, FEMUR (RIGHT) - correction of genu valgum.  Orthopediatrics distal femur plate.  MTF Maurilio fan.;  Surgeon: Adrian Shannon MD;   Location: [facility] OR 1ST FLR;  Service: Orthopedics;  Laterality: Right;  [facility] notified 2/21 MAL    OSTEOTOMY AND ULNAR SHORTENING Right 9/17/2021    Procedure: OSTEOTOMY, ULNA, FOR SHORTENING,RIGHT;  Surgeon: Vale Rich MD;  Location: [facility];  Service: Orthopedics;  Laterality: Right;  MAC/REGIONAL     REPAIR OF MENISCUS OF KNEE Right 6/27/2018    Procedure: REPAIR, MENISCUS, KNEE-- medial;  Surgeon: Adrian Shannon MD;  Location: [facility] OR 2ND FLR;  Service: Orthopedics;  Laterality: Right;    TONSILLECTOMY, ADENOIDECTOMY      TYMPANOSTOMY TUBE PLACEMENT       Family History   Problem Relation Name Age of Onset    ADD / ADHD Mother      Asthma Mother      Diabetes Mother      Eczema Mother      Thyroid disease Maternal Grandmother      Diabetes Maternal Grandfather      No Known Problems Father      No Known Problems Sister      No Known Problems Brother      No Known Problems Maternal Aunt      No Known Problems Maternal Uncle      No Known Problems Paternal Aunt      No Known Problems Paternal Uncle      No Known Problems Paternal Grandmother      No Known Problems Paternal Grandfather      Alcohol abuse Neg Hx      Allergies Neg Hx      Autism spectrum disorder Neg Hx      Behavior problems Neg Hx      Birth defects Neg Hx      Cancer Neg Hx      Chromosomal disorder Neg Hx      Cleft lip Neg Hx      Congenital heart disease Neg Hx      Depression Neg Hx      Early death Neg Hx      Hearing loss Neg Hx      Heart disease Neg Hx      Hyperlipidemia Neg Hx      Hypertension Neg Hx      Kidney disease Neg Hx      Learning disabilities Neg Hx      Mental illness Neg Hx      Migraines Neg Hx      Neurodegenerative disease Neg Hx      Obesity Neg Hx      Seizures Neg Hx      SIDS Neg Hx      Other Neg Hx       Social History     Tobacco Use    Smoking status: Never    Smokeless tobacco: Never   Substance Use Topics    Alcohol use: No    Drug use: No     Review of Systems    Physical Exam      Initial Vitals [02/02/25 1530]   BP Pulse Resp Temp SpO2   137/79 100 20 98.3 °F (36.8 °C) 100 %      MAP       --         Physical Exam    Vitals reviewed.  Constitutional: She appears well-developed and well-nourished. She is not diaphoretic. No distress.   HENT:   Head: Normocephalic and atraumatic. Mouth/Throat: Oropharynx is clear and moist.   Eyes: Conjunctivae and EOM are normal. Pupils are equal, round, and reactive to light.   Neck: Neck supple.   Normal range of motion.  Cardiovascular:  Normal rate.           Pulmonary/Chest: No respiratory distress.   Abdominal: She exhibits no distension.   Musculoskeletal:         General: Normal range of motion.      Cervical back: Normal range of motion and neck supple.      Comments: Boot RLE      Neurological: She is alert and oriented to person, place, and time. No cranial nerve deficit. GCS score is 15. GCS eye subscore is 4. GCS verbal subscore is 5. GCS motor subscore is 6.   Skin: Skin is warm and dry. Capillary refill takes less than 2 seconds.   Psychiatric: She has a normal mood and affect. Her behavior is normal. Judgment and thought content normal.         ED Course   Procedures  Labs Reviewed - No data to display       Imaging Results    None          Medications - No data to display  Medical Decision Making  Emergent evaluation of a 19 y.o. female presenting to the emergency department complaining of need for ostomy bag. Patient is afebrile, hemodynamically stable, and non toxic appearing.     Patient presents to the emergency department for a new ostomy bag.  States that she recently ran out of bags at home.  She needs a specific type of bag that doesn't leak. She is angry because she had to wait in the waiting room with sick people while her stoma was exposed.   Nursing staff was able to obtain a bag for her upon arrival to the intake room.  Upon my entrance into the room, patient is irate, curing, raising her voice. She says that this is not the  kind of bag that she wants. Unfortunately, this is the only bag we have to offer her today. She states that there is a hospital in [place] that has the right kind of bags. She didn't want to drive all the way over there, so came here instead. She has no other complaints.   She abruptly stops the interview and states that she wants to leave. She exits the room quickly and leaves the emergency department.                                       Clinical Impression:  Final diagnoses:  [Z93.3] Status post colostomy (Primary)          ED Disposition Condition    Discharge Stable          ED Prescriptions    None       Follow-up Information    None          Bonita Arias PA-C  02/02/25 1428

## 2025-02-03 ENCOUNTER — PATIENT OUTREACH (OUTPATIENT)
Facility: OTHER | Age: 20
End: 2025-02-03
Payer: MEDICAID

## 2025-02-03 NOTE — PROGRESS NOTES
Elsie Cruz LPN  ED Navigator  Emergency Department    Project: [facility] ED Navigator  Role: Community Health Worker    Date: 02/03/2025  Patient Name: Chari Rod  MRN: [id number]  PCP: Aneta Low NP    Assessment:     Chari Rod is a 19 y.o. female who has presented to ED for colostomy problems. Patient has visited the ED 3 times in the past 3 months. Patient did not contact PCP.     ED Navigator Initial Assessment    ED Navigator Enrollment Documentation  Consent to Services  Does patient consent to completing the assessment?: Yes  Contact  Method of Initial Contact: Phone  Transportation  Insurance Coverage  Do you have coverage/adequate coverage?: Yes  Specialist Appointment  Did the patient come to the ED to see a specialist?: No  Does the patient have a pending specialist referral?: No  Does the patient have a specialist appointment made?: No  PCP Follow Up Appointment  Medications  Is patient able to afford medication?: Yes  Psychological  Food  Communication/Education  Other Financial Concerns  Other Social Barriers/Concerns  Primary Barrier  Plan: Utility payment assistance resource given for their region         Social History     Socioeconomic History    Marital status: Single   Tobacco Use    Smoking status: Never    Smokeless tobacco: Never   Substance and Sexual Activity    Alcohol use: No    Drug use: No    Sexual activity: Yes     Partners: Male     Birth control/protection: OCP   Social History Narrative    Lives with mother.    2 dogs    2 ferrets     Social Drivers of Health     Financial Resource Strain: Low Risk  (2/3/2025)    Overall Financial Resource Strain (CARDIA)     Difficulty of Paying Living Expenses: Not very hard   Food Insecurity: No Food Insecurity (11/29/2024)    Received from [facility]    Hunger Vital Sign     Worried About Running Out of Food in the Last Year: Never true     Ran Out of Food in the Last Year: Never true   Transportation Needs: No Transportation  Needs (2/3/2025)    TRANSPORTATION NEEDS     Transportation : No   Physical Activity: Sufficiently Active (2/3/2025)    Exercise Vital Sign     Days of Exercise per Week: 7 days     Minutes of Exercise per Session: 150+ min   Stress: Stress Concern Present (2/3/2025)    [employer] - Occupational Stress Questionnaire     Feeling of Stress : To some extent   Housing Stability: Low Risk  (2/3/2025)    Housing Stability Vital Sign     Unable to Pay for Housing in the Last Year: No     Homeless in the Last Year: No       Plan:   D Navigator called to f/u from Pt's recent ED visit for Colostomy problems. Pt states she had to drive a distance to get the needed fit d/t it not being available at the first location. Pt states she is doing ok and denies having any current needs other than assistance with her phone bill. .rResources sent via email to [email or web address] for use at a later time with utilities, food, rent, transportation , housing and mental health needs. Pt encouraged to reach out with any concerns at they arise.  Elsie Cruz

## 2025-03-10 ENCOUNTER — PATIENT OUTREACH (OUTPATIENT)
Facility: OTHER | Age: 20
End: 2025-03-10
Payer: MEDICAID

## 2025-03-10 NOTE — PROGRESS NOTES
Spoke with Pt to f/u from her last ED visit. Pt states they are doing  good. She does not want to schedule anything at this time. ED navigator will follow-up with patient to assist as needed and to remind of upcoming appt's.

## 2025-04-21 ENCOUNTER — HOSPITAL ENCOUNTER (EMERGENCY)
Facility: HOSPITAL | Age: 20
Discharge: HOME | End: 2025-04-21
Attending: SURGERY
Payer: MEDICAID

## 2025-04-21 VITALS
OXYGEN SATURATION: 99 % | TEMPERATURE: 99 F | HEIGHT: 65 IN | BODY MASS INDEX: 33.52 KG/M2 | HEART RATE: 91 BPM | SYSTOLIC BLOOD PRESSURE: 112 MMHG | WEIGHT: 201.19 LBS | RESPIRATION RATE: 16 BRPM | DIASTOLIC BLOOD PRESSURE: 71 MMHG

## 2025-04-21 DIAGNOSIS — R11.2 NAUSEA AND VOMITING, UNSPECIFIED VOMITING TYPE: Primary | ICD-10-CM

## 2025-04-21 DIAGNOSIS — K22.6 MALLORY-WEISS TEAR: ICD-10-CM

## 2025-04-21 LAB
ABSOLUTE EOSINOPHIL (OHS): 0.22 K/UL
ABSOLUTE MONOCYTE (OHS): 0.5 K/UL (ref 0.3–1)
ABSOLUTE NEUTROPHIL COUNT (OHS): 7.7 K/UL (ref 1.8–7.7)
ALBUMIN SERPL BCP-MCNC: 3.7 G/DL (ref 3.5–5.2)
ALP SERPL-CCNC: 235 UNIT/L (ref 40–150)
ALT SERPL W/O P-5'-P-CCNC: 59 UNIT/L (ref 10–44)
ANION GAP (OHS): 9 MMOL/L (ref 8–16)
AST SERPL-CCNC: 35 UNIT/L (ref 11–45)
B-HCG UR QL: NEGATIVE
BASOPHILS # BLD AUTO: 0.05 K/UL
BASOPHILS NFR BLD AUTO: 0.5 %
BILIRUB SERPL-MCNC: 0.3 MG/DL (ref 0.1–1)
BILIRUB UR QL STRIP.AUTO: NEGATIVE
BUN SERPL-MCNC: 6 MG/DL (ref 6–20)
CALCIUM SERPL-MCNC: 8.9 MG/DL (ref 8.7–10.5)
CHLORIDE SERPL-SCNC: 108 MMOL/L (ref 95–110)
CLARITY UR: CLEAR
CO2 SERPL-SCNC: 26 MMOL/L (ref 23–29)
COLOR UR AUTO: YELLOW
CREAT SERPL-MCNC: 0.7 MG/DL (ref 0.5–1.4)
ERYTHROCYTE [DISTWIDTH] IN BLOOD BY AUTOMATED COUNT: 15 % (ref 11.5–14.5)
GFR SERPLBLD CREATININE-BSD FMLA CKD-EPI: >60 ML/MIN/1.73/M2
GLUCOSE SERPL-MCNC: 105 MG/DL (ref 70–110)
GLUCOSE UR QL STRIP: NEGATIVE
HCT VFR BLD AUTO: 37.7 % (ref 37–48.5)
HGB BLD-MCNC: 12.6 GM/DL (ref 12–16)
HGB UR QL STRIP: NEGATIVE
HOLD SPECIMEN: NORMAL
IMM GRANULOCYTES # BLD AUTO: 0.07 K/UL (ref 0–0.04)
IMM GRANULOCYTES NFR BLD AUTO: 0.6 % (ref 0–0.5)
KETONES UR QL STRIP: NEGATIVE
LEUKOCYTE ESTERASE UR QL STRIP: NEGATIVE
LIPASE SERPL-CCNC: 18 U/L (ref 4–60)
LYMPHOCYTES # BLD AUTO: 2.56 K/UL (ref 1–4.8)
MCH RBC QN AUTO: 29.7 PG (ref 27–31)
MCHC RBC AUTO-ENTMCNC: 33.4 G/DL (ref 32–36)
MCV RBC AUTO: 89 FL (ref 82–98)
NITRITE UR QL STRIP: NEGATIVE
NUCLEATED RBC (/100WBC) (OHS): 0 /100 WBC
PH UR STRIP: 8 [PH]
PLATELET # BLD AUTO: 339 K/UL (ref 150–450)
PMV BLD AUTO: 7.9 FL (ref 9.2–12.9)
POTASSIUM SERPL-SCNC: 3.7 MMOL/L (ref 3.5–5.1)
PROT SERPL-MCNC: 7.7 GM/DL (ref 6–8.4)
PROT UR QL STRIP: NEGATIVE
RBC # BLD AUTO: 4.24 M/UL (ref 4–5.4)
RELATIVE EOSINOPHIL (OHS): 2 %
RELATIVE LYMPHOCYTE (OHS): 23.1 % (ref 18–48)
RELATIVE MONOCYTE (OHS): 4.5 % (ref 4–15)
RELATIVE NEUTROPHIL (OHS): 69.3 % (ref 38–73)
SODIUM SERPL-SCNC: 143 MMOL/L (ref 136–145)
SP GR UR STRIP: 1.01
UROBILINOGEN UR STRIP-ACNC: NEGATIVE EU/DL
WBC # BLD AUTO: 11.1 K/UL (ref 3.9–12.7)

## 2025-04-21 PROCEDURE — 85025 COMPLETE CBC W/AUTO DIFF WBC: CPT | Performed by: SURGERY

## 2025-04-21 PROCEDURE — 96361 HYDRATE IV INFUSION ADD-ON: CPT

## 2025-04-21 PROCEDURE — 81025 URINE PREGNANCY TEST: CPT | Performed by: SURGERY

## 2025-04-21 PROCEDURE — 25000003 PHARM REV CODE 250: Performed by: SURGERY

## 2025-04-21 PROCEDURE — 63600175 PHARM REV CODE 636 W HCPCS: Performed by: SURGERY

## 2025-04-21 PROCEDURE — 83690 ASSAY OF LIPASE: CPT | Performed by: SURGERY

## 2025-04-21 PROCEDURE — 63600175 PHARM REV CODE 636 W HCPCS

## 2025-04-21 PROCEDURE — 99285 EMERGENCY DEPT VISIT HI MDM: CPT | Mod: 25

## 2025-04-21 PROCEDURE — 80053 COMPREHEN METABOLIC PANEL: CPT | Performed by: SURGERY

## 2025-04-21 PROCEDURE — 25500020 PHARM REV CODE 255

## 2025-04-21 PROCEDURE — 81003 URINALYSIS AUTO W/O SCOPE: CPT | Performed by: SURGERY

## 2025-04-21 PROCEDURE — 96365 THER/PROPH/DIAG IV INF INIT: CPT

## 2025-04-21 PROCEDURE — 25000003 PHARM REV CODE 250

## 2025-04-21 PROCEDURE — 96375 TX/PRO/DX INJ NEW DRUG ADDON: CPT

## 2025-04-21 RX ORDER — METHYLPREDNISOLONE SOD SUCC 125 MG
80 VIAL (EA) INJECTION
Status: DISCONTINUED | OUTPATIENT
Start: 2025-04-21 | End: 2025-04-21

## 2025-04-21 RX ORDER — PANTOPRAZOLE SODIUM 40 MG/10ML
80 INJECTION, POWDER, LYOPHILIZED, FOR SOLUTION INTRAVENOUS
Status: COMPLETED | OUTPATIENT
Start: 2025-04-21 | End: 2025-04-21

## 2025-04-21 RX ORDER — ONDANSETRON HYDROCHLORIDE 2 MG/ML
8 INJECTION, SOLUTION INTRAVENOUS
Status: COMPLETED | OUTPATIENT
Start: 2025-04-21 | End: 2025-04-21

## 2025-04-21 RX ORDER — DIPHENHYDRAMINE HYDROCHLORIDE 50 MG/ML
25 INJECTION, SOLUTION INTRAMUSCULAR; INTRAVENOUS
Status: COMPLETED | OUTPATIENT
Start: 2025-04-21 | End: 2025-04-21

## 2025-04-21 RX ADMIN — ONDANSETRON 8 MG: 2 INJECTION INTRAMUSCULAR; INTRAVENOUS at 05:04

## 2025-04-21 RX ADMIN — PANTOPRAZOLE SODIUM 80 MG: 40 INJECTION, POWDER, LYOPHILIZED, FOR SOLUTION INTRAVENOUS at 06:04

## 2025-04-21 RX ADMIN — DIPHENHYDRAMINE HYDROCHLORIDE 25 MG: 50 INJECTION INTRAMUSCULAR; INTRAVENOUS at 05:04

## 2025-04-21 RX ADMIN — PROMETHAZINE HYDROCHLORIDE 12.5 MG: 25 INJECTION INTRAMUSCULAR; INTRAVENOUS at 06:04

## 2025-04-21 RX ADMIN — IOHEXOL 100 ML: 350 INJECTION, SOLUTION INTRAVENOUS at 07:04

## 2025-04-21 RX ADMIN — SODIUM CHLORIDE 1000 ML: 9 INJECTION, SOLUTION INTRAVENOUS at 06:04
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
## 2025-04-22 NOTE — DISCHARGE INSTRUCTIONS
Thank you for coming to our Emergency Department today!     -return to the emergency department for further episodes of vomiting or bloody vomit  -Follow-up with primary care doctor within 3-7 days to discuss your recent ER visit and any additional concerns that you may have.    Return to the Emergency Department for symptoms including but not limited to: persistence or worsening of symptoms, shortness of breath or chest pain, inability to drink without vomiting, passing out/fainting/ loss of consciousness, or if you have other concerns.

## 2025-05-07 ENCOUNTER — PATIENT OUTREACH (OUTPATIENT)
Facility: OTHER | Age: 20
End: 2025-05-07
Payer: MEDICAID

## 2025-05-12 NOTE — PROGRESS NOTES
CC: Post-op    DATE OF PROCEDURE:  2/27/2020     PREOPERATIVE DIAGNOSES:  1.  Right knee pain.  2.  Right genu valgum.     POSTOPERATIVE DIAGNOSES:  1.  Right knee pain.  2.  Right genu valgum.     PROCEDURES:  Right distal femoral varus-producing osteotomy with plate fixation.    HPI: Chari Rod is now 7 weeks post-op following Right Distal Femur Osteotomy.   Doing well, no complaints.    PE: Incisions well-healed with no sign of infection.  Well-perfused, neurovascularly intact distally.  Range of motion full, good strength.    X-rays - healed osteotomy, good alignment    Clinical decision-making: Doing well.  Continue working with physical therapy.  WBAT, RTC 2 months, hip to ankle X-rays.   Pt is calling saying Dr Gil sent her to a gym  for help with lymphedema and they have not opened back up so pt is asking if Dr Gil wants to send her some where else please call pt to let her know   What Dr Gil wants to do .

## 2025-05-30 ENCOUNTER — TELEPHONE (OUTPATIENT)
Dept: EMERGENCY MEDICINE | Facility: HOSPITAL | Age: 20
End: 2025-05-30
Payer: MEDICAID

## 2025-05-30 ENCOUNTER — HOSPITAL ENCOUNTER (EMERGENCY)
Facility: HOSPITAL | Age: 20
Discharge: HOME | End: 2025-05-30
Attending: EMERGENCY MEDICINE
Payer: MEDICAID

## 2025-05-30 VITALS
DIASTOLIC BLOOD PRESSURE: 71 MMHG | RESPIRATION RATE: 20 BRPM | HEART RATE: 86 BPM | SYSTOLIC BLOOD PRESSURE: 127 MMHG | BODY MASS INDEX: 33.53 KG/M2 | TEMPERATURE: 99 F | WEIGHT: 201.25 LBS | HEIGHT: 65 IN | OXYGEN SATURATION: 98 %

## 2025-05-30 DIAGNOSIS — Z32.02 PREGNANCY EXAMINATION OR TEST, NEGATIVE RESULT: Primary | ICD-10-CM

## 2025-05-30 DIAGNOSIS — N30.00 ACUTE CYSTITIS WITHOUT HEMATURIA: ICD-10-CM

## 2025-05-30 LAB
B-HCG UR QL: NEGATIVE
BACTERIA #/AREA URNS HPF: ABNORMAL /HPF
BILIRUB UR QL STRIP.AUTO: NEGATIVE
CLARITY UR: ABNORMAL
COLOR UR AUTO: YELLOW
GLUCOSE UR QL STRIP: NEGATIVE
HGB UR QL STRIP: NEGATIVE
HOLD SPECIMEN: NORMAL
KETONES UR QL STRIP: NEGATIVE
LEUKOCYTE ESTERASE UR QL STRIP: NEGATIVE
MICROSCOPIC COMMENT: ABNORMAL
NITRITE UR QL STRIP: NEGATIVE
PH UR STRIP: 6 [PH]
PROT UR QL STRIP: NEGATIVE
SP GR UR STRIP: 1.02
UROBILINOGEN UR STRIP-ACNC: NEGATIVE EU/DL
WBC #/AREA URNS HPF: 20 /HPF (ref 0–5)

## 2025-05-30 PROCEDURE — 81003 URINALYSIS AUTO W/O SCOPE: CPT

## 2025-05-30 PROCEDURE — 87086 URINE CULTURE/COLONY COUNT: CPT

## 2025-05-30 PROCEDURE — 99283 EMERGENCY DEPT VISIT LOW MDM: CPT

## 2025-05-30 PROCEDURE — 81025 URINE PREGNANCY TEST: CPT

## 2025-05-30 RX ORDER — CEPHALEXIN 500 MG/1
500 CAPSULE ORAL EVERY 12 HOURS
Qty: 14 CAPSULE | Refills: 0 | Status: SHIPPED | OUTPATIENT
Start: 2025-05-30 | End: 2025-06-06
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
## 2025-05-30 NOTE — ED TRIAGE NOTES
Pt arrived to ED c/o positive pregnancy test at home with symptoms (morning sickness (vomiting), sore breasts). Symptoms started 2-3 days ago. Last menstrual cycle was 2 weeks ago. Pt wants to confirm she is pregnant.

## 2025-06-01 LAB — BACTERIA UR CULT: NORMAL

## 2025-06-25 ENCOUNTER — PATIENT OUTREACH (OUTPATIENT)
Facility: OTHER | Age: 20
End: 2025-06-25
Payer: MEDICAID

## 2025-08-09 ENCOUNTER — HOSPITAL ENCOUNTER (EMERGENCY)
Facility: HOSPITAL | Age: 20
Discharge: HOME | End: 2025-08-09
Attending: SURGERY
Payer: MEDICAID

## 2025-08-09 VITALS
HEART RATE: 70 BPM | TEMPERATURE: 98 F | OXYGEN SATURATION: 100 % | HEIGHT: 64 IN | SYSTOLIC BLOOD PRESSURE: 109 MMHG | BODY MASS INDEX: 37.3 KG/M2 | RESPIRATION RATE: 16 BRPM | DIASTOLIC BLOOD PRESSURE: 71 MMHG | WEIGHT: 218.5 LBS

## 2025-08-09 DIAGNOSIS — R11.10 EMESIS: ICD-10-CM

## 2025-08-09 DIAGNOSIS — K52.9 GASTROENTERITIS: Primary | ICD-10-CM

## 2025-08-09 DIAGNOSIS — R19.7 DIARRHEA, UNSPECIFIED TYPE: ICD-10-CM

## 2025-08-09 LAB
ABSOLUTE EOSINOPHIL (OHS): 0.42 K/UL
ABSOLUTE MONOCYTE (OHS): 0.88 K/UL (ref 0.3–1)
ABSOLUTE NEUTROPHIL COUNT (OHS): 4.66 K/UL (ref 1.8–7.7)
ALBUMIN SERPL BCP-MCNC: 4.3 G/DL (ref 3.5–5.2)
ALP SERPL-CCNC: 149 UNIT/L (ref 40–150)
ALT SERPL W/O P-5'-P-CCNC: 24 UNIT/L (ref 10–44)
AMPHET UR QL SCN: NEGATIVE
ANION GAP (OHS): 12 MMOL/L (ref 8–16)
AST SERPL-CCNC: 23 UNIT/L (ref 11–45)
B-HCG UR QL: NEGATIVE
BARBITURATE SCN PRESENT UR: NEGATIVE
BASOPHILS # BLD AUTO: 0.05 K/UL
BASOPHILS NFR BLD AUTO: 0.5 %
BENZODIAZ UR QL SCN: NEGATIVE
BILIRUB SERPL-MCNC: 0.5 MG/DL (ref 0.1–1)
BILIRUB UR QL STRIP.AUTO: NEGATIVE
BUN SERPL-MCNC: 10 MG/DL (ref 6–20)
CALCIUM SERPL-MCNC: 9.3 MG/DL (ref 8.7–10.5)
CANNABINOIDS UR QL SCN: NEGATIVE
CHLORIDE SERPL-SCNC: 107 MMOL/L (ref 95–110)
CK SERPL-CCNC: 70 U/L (ref 20–180)
CLARITY UR: ABNORMAL
CO2 SERPL-SCNC: 22 MMOL/L (ref 23–29)
COCAINE UR QL SCN: NEGATIVE
COLOR UR AUTO: YELLOW
CREAT SERPL-MCNC: 0.7 MG/DL (ref 0.5–1.4)
CREAT UR-MCNC: 259.6 MG/DL (ref 15–325)
ERYTHROCYTE [DISTWIDTH] IN BLOOD BY AUTOMATED COUNT: 12.3 % (ref 11.5–14.5)
GFR SERPLBLD CREATININE-BSD FMLA CKD-EPI: >60 ML/MIN/1.73/M2
GLUCOSE SERPL-MCNC: 78 MG/DL (ref 70–110)
GLUCOSE UR QL STRIP: NEGATIVE
GROUP A STREP MOLECULAR (OHS): NEGATIVE
HCT VFR BLD AUTO: 37.3 % (ref 37–48.5)
HGB BLD-MCNC: 12.9 GM/DL (ref 12–16)
HGB UR QL STRIP: NEGATIVE
IMM GRANULOCYTES # BLD AUTO: 0.02 K/UL (ref 0–0.04)
IMM GRANULOCYTES NFR BLD AUTO: 0.2 % (ref 0–0.5)
INFLUENZA A MOLECULAR (OHS): NEGATIVE
INFLUENZA B MOLECULAR (OHS): NEGATIVE
KETONES UR QL STRIP: NEGATIVE
LEUKOCYTE ESTERASE UR QL STRIP: NEGATIVE
LIPASE SERPL-CCNC: 18 U/L (ref 4–60)
LYMPHOCYTES # BLD AUTO: 3.82 K/UL (ref 1–4.8)
MCH RBC QN AUTO: 30.5 PG (ref 27–31)
MCHC RBC AUTO-ENTMCNC: 34.6 G/DL (ref 32–36)
MCV RBC AUTO: 88 FL (ref 82–98)
METHADONE UR QL SCN: NEGATIVE
NITRITE UR QL STRIP: NEGATIVE
NUCLEATED RBC (/100WBC) (OHS): 0 /100 WBC
OPIATES UR QL SCN: NEGATIVE
PCP UR QL: NEGATIVE
PH UR STRIP: 6 [PH]
PLATELET # BLD AUTO: 326 K/UL (ref 150–450)
PMV BLD AUTO: 8 FL (ref 9.2–12.9)
POTASSIUM SERPL-SCNC: 3.7 MMOL/L (ref 3.5–5.1)
PROT SERPL-MCNC: 7.5 GM/DL (ref 6–8.4)
PROT UR QL STRIP: NEGATIVE
RBC # BLD AUTO: 4.23 M/UL (ref 4–5.4)
RELATIVE EOSINOPHIL (OHS): 4.3 %
RELATIVE LYMPHOCYTE (OHS): 38.8 % (ref 18–48)
RELATIVE MONOCYTE (OHS): 8.9 % (ref 4–15)
RELATIVE NEUTROPHIL (OHS): 47.3 % (ref 38–73)
SARS-COV-2 RDRP RESP QL NAA+PROBE: NEGATIVE
SODIUM SERPL-SCNC: 141 MMOL/L (ref 136–145)
SP GR UR STRIP: >=1.03
TROPONIN I SERPL HS-MCNC: <3 NG/L
UROBILINOGEN UR STRIP-ACNC: NEGATIVE EU/DL
WBC # BLD AUTO: 9.85 K/UL (ref 3.9–12.7)

## 2025-08-09 PROCEDURE — 99900035 HC TECH TIME PER 15 MIN (STAT)

## 2025-08-09 PROCEDURE — 87502 INFLUENZA DNA AMP PROBE: CPT | Performed by: SURGERY

## 2025-08-09 PROCEDURE — 83690 ASSAY OF LIPASE: CPT | Performed by: SURGERY

## 2025-08-09 PROCEDURE — 96360 HYDRATION IV INFUSION INIT: CPT

## 2025-08-09 PROCEDURE — 81025 URINE PREGNANCY TEST: CPT | Performed by: SURGERY

## 2025-08-09 PROCEDURE — 85025 COMPLETE CBC W/AUTO DIFF WBC: CPT | Performed by: SURGERY

## 2025-08-09 PROCEDURE — 80053 COMPREHEN METABOLIC PANEL: CPT | Performed by: SURGERY

## 2025-08-09 PROCEDURE — 87651 STREP A DNA AMP PROBE: CPT | Performed by: SURGERY

## 2025-08-09 PROCEDURE — 81003 URINALYSIS AUTO W/O SCOPE: CPT | Performed by: SURGERY

## 2025-08-09 PROCEDURE — 84484 ASSAY OF TROPONIN QUANT: CPT | Performed by: SURGERY

## 2025-08-09 PROCEDURE — 93010 ELECTROCARDIOGRAM REPORT: CPT | Mod: ,,, | Performed by: INTERNAL MEDICINE

## 2025-08-09 PROCEDURE — 99285 EMERGENCY DEPT VISIT HI MDM: CPT | Mod: 25

## 2025-08-09 PROCEDURE — 80307 DRUG TEST PRSMV CHEM ANLYZR: CPT | Performed by: SURGERY

## 2025-08-09 PROCEDURE — 25000003 PHARM REV CODE 250: Performed by: SURGERY

## 2025-08-09 PROCEDURE — U0002 COVID-19 LAB TEST NON-CDC: HCPCS | Performed by: SURGERY

## 2025-08-09 PROCEDURE — 82550 ASSAY OF CK (CPK): CPT | Performed by: SURGERY

## 2025-08-09 PROCEDURE — 93005 ELECTROCARDIOGRAM TRACING: CPT

## 2025-08-09 RX ORDER — DIPHENOXYLATE HYDROCHLORIDE AND ATROPINE SULFATE 2.5; .025 MG/1; MG/1
2 TABLET ORAL
Status: COMPLETED | OUTPATIENT
Start: 2025-08-09 | End: 2025-08-09

## 2025-08-09 RX ORDER — ONDANSETRON 4 MG/1
4 TABLET, ORALLY DISINTEGRATING ORAL EVERY 8 HOURS PRN
Qty: 20 TABLET | Refills: 0 | Status: SHIPPED | OUTPATIENT
Start: 2025-08-09

## 2025-08-09 RX ORDER — LOPERAMIDE HYDROCHLORIDE 2 MG/1
2 CAPSULE ORAL 4 TIMES DAILY PRN
Qty: 12 CAPSULE | Refills: 0 | Status: SHIPPED | OUTPATIENT
Start: 2025-08-09 | End: 2025-08-19

## 2025-08-09 RX ADMIN — DIPHENOXYLATE HYDROCHLORIDE AND ATROPINE SULFATE 2 TABLET: 2.5; .025 TABLET ORAL at 11:08

## 2025-08-09 RX ADMIN — SODIUM CHLORIDE 500 ML: 9 INJECTION, SOLUTION INTRAVENOUS at 10:08

## 2025-08-10 LAB — HOLD SPECIMEN: NORMAL

## 2025-08-11 LAB
OHS QRS DURATION: 82 MS
OHS QTC CALCULATION: 457 MS

## 2025-08-20 ENCOUNTER — HOSPITAL ENCOUNTER (EMERGENCY)
Facility: HOSPITAL | Age: 20
Discharge: HOME | End: 2025-08-20
Attending: SURGERY
Payer: MEDICAID

## 2025-08-20 VITALS
WEIGHT: 214.63 LBS | SYSTOLIC BLOOD PRESSURE: 127 MMHG | DIASTOLIC BLOOD PRESSURE: 73 MMHG | OXYGEN SATURATION: 100 % | RESPIRATION RATE: 20 BRPM | BODY MASS INDEX: 36.64 KG/M2 | HEIGHT: 64 IN | TEMPERATURE: 99 F | HEART RATE: 107 BPM

## 2025-08-20 DIAGNOSIS — U07.1 COVID-19: ICD-10-CM

## 2025-08-20 DIAGNOSIS — R09.A2 FOREIGN BODY SENSATION IN THROAT: ICD-10-CM

## 2025-08-20 DIAGNOSIS — E87.6 HYPOKALEMIA: Primary | ICD-10-CM

## 2025-08-20 LAB
ABSOLUTE EOSINOPHIL (OHS): 0.06 K/UL
ABSOLUTE MONOCYTE (OHS): 1.48 K/UL (ref 0.3–1)
ABSOLUTE NEUTROPHIL COUNT (OHS): 12.66 K/UL (ref 1.8–7.7)
ALBUMIN SERPL BCP-MCNC: 3.9 G/DL (ref 3.5–5.2)
ALP SERPL-CCNC: 139 UNIT/L (ref 40–150)
ALT SERPL W/O P-5'-P-CCNC: 24 UNIT/L (ref 10–44)
ANION GAP (OHS): 12 MMOL/L (ref 8–16)
AST SERPL-CCNC: 23 UNIT/L (ref 11–45)
B-HCG UR QL: NEGATIVE
BASOPHILS # BLD AUTO: 0.04 K/UL
BASOPHILS NFR BLD AUTO: 0.2 %
BILIRUB SERPL-MCNC: 1 MG/DL (ref 0.1–1)
BUN SERPL-MCNC: 4 MG/DL (ref 6–20)
CALCIUM SERPL-MCNC: 9.2 MG/DL (ref 8.7–10.5)
CHLORIDE SERPL-SCNC: 106 MMOL/L (ref 95–110)
CO2 SERPL-SCNC: 22 MMOL/L (ref 23–29)
CREAT SERPL-MCNC: 0.6 MG/DL (ref 0.5–1.4)
ERYTHROCYTE [DISTWIDTH] IN BLOOD BY AUTOMATED COUNT: 12.3 % (ref 11.5–14.5)
FLUAV AG UPPER RESP QL IA.RAPID: NEGATIVE
FLUBV AG UPPER RESP QL IA.RAPID: NEGATIVE
GFR SERPLBLD CREATININE-BSD FMLA CKD-EPI: >60 ML/MIN/1.73/M2
GLUCOSE SERPL-MCNC: 94 MG/DL (ref 70–110)
GROUP A STREP MOLECULAR (OHS): NEGATIVE
HCT VFR BLD AUTO: 37 % (ref 37–48.5)
HGB BLD-MCNC: 12.8 GM/DL (ref 12–16)
IMM GRANULOCYTES # BLD AUTO: 0.06 K/UL (ref 0–0.04)
IMM GRANULOCYTES NFR BLD AUTO: 0.4 % (ref 0–0.5)
LACTATE SERPL-SCNC: 0.9 MMOL/L (ref 0.5–2.2)
LYMPHOCYTES # BLD AUTO: 1.78 K/UL (ref 1–4.8)
MCH RBC QN AUTO: 30.5 PG (ref 27–31)
MCHC RBC AUTO-ENTMCNC: 34.6 G/DL (ref 32–36)
MCV RBC AUTO: 88 FL (ref 82–98)
NUCLEATED RBC (/100WBC) (OHS): 0 /100 WBC
PLATELET # BLD AUTO: 268 K/UL (ref 150–450)
PMV BLD AUTO: 8.3 FL (ref 9.2–12.9)
POTASSIUM SERPL-SCNC: 3 MMOL/L (ref 3.5–5.1)
PROT SERPL-MCNC: 7.7 GM/DL (ref 6–8.4)
RBC # BLD AUTO: 4.2 M/UL (ref 4–5.4)
RELATIVE EOSINOPHIL (OHS): 0.4 %
RELATIVE LYMPHOCYTE (OHS): 11.1 % (ref 18–48)
RELATIVE MONOCYTE (OHS): 9.2 % (ref 4–15)
RELATIVE NEUTROPHIL (OHS): 78.7 % (ref 38–73)
SARS-COV-2 RDRP RESP QL NAA+PROBE: POSITIVE
SODIUM SERPL-SCNC: 140 MMOL/L (ref 136–145)
WBC # BLD AUTO: 16.08 K/UL (ref 3.9–12.7)

## 2025-08-20 PROCEDURE — 25500020 PHARM REV CODE 255: Performed by: SURGERY

## 2025-08-20 PROCEDURE — 87040 BLOOD CULTURE FOR BACTERIA: CPT | Performed by: NURSE PRACTITIONER

## 2025-08-20 PROCEDURE — 87651 STREP A DNA AMP PROBE: CPT | Performed by: NURSE PRACTITIONER

## 2025-08-20 PROCEDURE — 80053 COMPREHEN METABOLIC PANEL: CPT | Performed by: NURSE PRACTITIONER

## 2025-08-20 PROCEDURE — 25000003 PHARM REV CODE 250: Performed by: NURSE PRACTITIONER

## 2025-08-20 PROCEDURE — 99285 EMERGENCY DEPT VISIT HI MDM: CPT | Mod: 25

## 2025-08-20 PROCEDURE — 81025 URINE PREGNANCY TEST: CPT | Performed by: NURSE PRACTITIONER

## 2025-08-20 PROCEDURE — U0002 COVID-19 LAB TEST NON-CDC: HCPCS | Performed by: NURSE PRACTITIONER

## 2025-08-20 PROCEDURE — 87502 INFLUENZA DNA AMP PROBE: CPT | Performed by: NURSE PRACTITIONER

## 2025-08-20 PROCEDURE — 85025 COMPLETE CBC W/AUTO DIFF WBC: CPT | Performed by: NURSE PRACTITIONER

## 2025-08-20 PROCEDURE — 83605 ASSAY OF LACTIC ACID: CPT | Performed by: NURSE PRACTITIONER

## 2025-08-20 RX ORDER — POTASSIUM CHLORIDE 20 MEQ/1
40 TABLET, EXTENDED RELEASE ORAL
Status: DISCONTINUED | OUTPATIENT
Start: 2025-08-20 | End: 2025-08-20 | Stop reason: HOSPADM

## 2025-08-20 RX ORDER — FLUTICASONE PROPIONATE 50 MCG
1 SPRAY, SUSPENSION (ML) NASAL DAILY PRN
Qty: 15 G | Refills: 0 | Status: SHIPPED | OUTPATIENT
Start: 2025-08-20 | End: 2025-08-27

## 2025-08-20 RX ORDER — ALBUTEROL SULFATE 90 UG/1
1-2 INHALANT RESPIRATORY (INHALATION) EVERY 6 HOURS PRN
Qty: 6.7 G | Refills: 0 | Status: SHIPPED | OUTPATIENT
Start: 2025-08-20 | End: 2026-08-20

## 2025-08-20 RX ORDER — PROMETHAZINE HYDROCHLORIDE AND DEXTROMETHORPHAN HYDROBROMIDE 6.25; 15 MG/5ML; MG/5ML
5 SYRUP ORAL EVERY 6 HOURS PRN
Qty: 100 ML | Refills: 0 | Status: SHIPPED | OUTPATIENT
Start: 2025-08-20

## 2025-08-20 RX ORDER — SODIUM CHLORIDE 9 MG/ML
1000 INJECTION, SOLUTION INTRAVENOUS
Status: COMPLETED | OUTPATIENT
Start: 2025-08-20 | End: 2025-08-20

## 2025-08-20 RX ADMIN — IOHEXOL 75 ML: 350 INJECTION, SOLUTION INTRAVENOUS at 01:08

## 2025-08-20 RX ADMIN — SODIUM CHLORIDE 1000 ML: 9 INJECTION, SOLUTION INTRAVENOUS at 12:08

## 2025-08-26 LAB
BACTERIA BLD CULT: NORMAL
BACTERIA BLD CULT: NORMAL

## (undated) DEVICE — DRESSING N ADH OIL EMUL 3X3

## (undated) DEVICE — BLADE #15 STERILE CARBON

## (undated) DEVICE — ADHESIVE DERMABOND ADVANCED

## (undated) DEVICE — SCREW BNE LOK HEXLB 3.5X14
Type: IMPLANTABLE DEVICE | Site: ULNA | Status: NON-FUNCTIONAL
Removed: 2021-09-17

## (undated) DEVICE — DRAPE STERI U-SHAPED 47X51IN

## (undated) DEVICE — SEE MEDLINE ITEM 146270

## (undated) DEVICE — PADDING CAST SPECIALIST 6X4YD

## (undated) DEVICE — PLATE TACK TEMP 3 IN
Type: IMPLANTABLE DEVICE | Site: ULNA | Status: NON-FUNCTIONAL
Removed: 2021-09-17

## (undated) DEVICE — DRAPE C-ARMOR EQUIPMENT COVER

## (undated) DEVICE — PAD COLD THERAPY KNEE WRAP ON

## (undated) DEVICE — CLOSURE SKIN STERI STRIP 1/2X4

## (undated) DEVICE — DRAPE INCISE IOBAN 2 23X17IN

## (undated) DEVICE — APPLICATOR CHLORAPREP ORN 26ML

## (undated) DEVICE — SUT CTD VICRYL 4-0 P-3 18IN

## (undated) DEVICE — TUBING & CANNULA JOINT SMALL

## (undated) DEVICE — PAD CAST SPECIALIST STRL 4

## (undated) DEVICE — Device

## (undated) DEVICE — DRESSING N ADH OIL EMUL 3X8

## (undated) DEVICE — WIRE K SMTH ACUTRAK .062X9.25
Type: IMPLANTABLE DEVICE | Site: TIBIA | Status: NON-FUNCTIONAL
Removed: 2020-10-01

## (undated) DEVICE — BIT DRILL ACUTRAK 2 4.7MM LONG

## (undated) DEVICE — TUBE SET INFLOW/OUTFLOW

## (undated) DEVICE — GAUZE SPONGE 4'X4 12 PLY

## (undated) DEVICE — TRAY MINOR ORTHO

## (undated) DEVICE — BIT DRILL QUICK RELEASE 2.8MM

## (undated) DEVICE — BIT DRILL ACUTRAK 2 4.7MM

## (undated) DEVICE — TRAP DIGIT FINGER

## (undated) DEVICE — SEE MEDLINE ITEM 152515

## (undated) DEVICE — TOURNIQUET SB QC DP 18X4IN

## (undated) DEVICE — BANDAGE ACE ELASTIC 6"

## (undated) DEVICE — BLADE SAGITTA 5/BX

## (undated) DEVICE — DRAPE PLASTIC U 60X72

## (undated) DEVICE — SEE MEDLINE ITEM 146298

## (undated) DEVICE — SUT VICRYL PLUS 3-0 SH 18IN

## (undated) DEVICE — SEE L#120831

## (undated) DEVICE — PADDING CAST 6IN DELTA ROLL

## (undated) DEVICE — BLADE ULTRACUT 3.5MM

## (undated) DEVICE — TOURNIQUET HEMACLEAR X-LARGE

## (undated) DEVICE — DRAPE C ARM 42 X 120 10/BX

## (undated) DEVICE — DRAPE EMERALD 87X114.75X113

## (undated) DEVICE — DRESSING XEROFORM FOIL PK 1X8

## (undated) DEVICE — PAD CAST SPECIALIST STRL 6

## (undated) DEVICE — NDL SPINAL 18GX3.5 SPINOCAN

## (undated) DEVICE — SUT ETHILON 4-0 PS2 18 BLK

## (undated) DEVICE — BANDAGE ELASTIC 2X5 VELCRO ST

## (undated) DEVICE — DRILL QUICK RELEASE 2.8MM 5IN

## (undated) DEVICE — SUT VICRYL+ 1 CT1 18IN

## (undated) DEVICE — BRACE KNEE T SCOPE PREMIER

## (undated) DEVICE — DRESSING AQUACEL AG 3.5X10IN

## (undated) DEVICE — GLOVE BIOGEL PI MICRO INDIC 7

## (undated) DEVICE — GOWN SMART IMP BREATHABLE XXLG

## (undated) DEVICE — TOURNIQUET HEMACLEAR LARGE

## (undated) DEVICE — SEE MEDLINE ITEM 157173

## (undated) DEVICE — ELECTRODE REM PLYHSV RETURN 9

## (undated) DEVICE — DRAPE STERI-DRAPE 1000 17X11IN

## (undated) DEVICE — BANDAGE ELASTIC ACE 2IN 10/CA

## (undated) DEVICE — 3.2 DRILL BIT

## (undated) DEVICE — SUT BONE WAX 2.5 GRMS 12/BX

## (undated) DEVICE — 3.5MM DRILL BIT

## (undated) DEVICE — SEE MEDLINE ITEM 157131

## (undated) DEVICE — GAUZE SPONGE 4X4 12PLY

## (undated) DEVICE — SEE MEDLINE ITEM 157216

## (undated) DEVICE — SEE MEDLINE ITEM 152522

## (undated) DEVICE — FORCEP STRAIGHT DISP

## (undated) DEVICE — DRESSING XEROFORM 1X8IN

## (undated) DEVICE — SEE MEDLINE ITEM 157150

## (undated) DEVICE — SUT VICRYL PLUS 0 CT1 18IN

## (undated) DEVICE — SOL IRR NACL .9% 3000ML

## (undated) DEVICE — BLADE SCALP OPHTL RND TIP

## (undated) DEVICE — NDL 18GA X1 1/2 REG BEVEL

## (undated) DEVICE — PROBE ARTHSCP EDGE ENERGY 30

## (undated) DEVICE — BLADE ACL FINE 9.5X25.5X.4MM

## (undated) DEVICE — SUT MCRYL PLUS 4-0 PS2 27IN

## (undated) DEVICE — KIT IRR SUCTION HND PIECE

## (undated) DEVICE — GLOVE BIOGEL SKINSENSE PI 7.0

## (undated) DEVICE — PACK ARTHROSCOPY

## (undated) DEVICE — BLADE GATOR 2.0

## (undated) DEVICE — IMMOB KNEE UNIV TRI PANEL 19IN

## (undated) DEVICE — NDL 22GA X1 1/2 REG BEVEL

## (undated) DEVICE — SLING ARM MEDIUM FOAM STRAP

## (undated) DEVICE — SUT CTD VICRYL VIL BR SH 27

## (undated) DEVICE — STOCKINET TUBULAR 1 PLY 6X60IN

## (undated) DEVICE — KIT SEQUENT KNEE RECON
Type: IMPLANTABLE DEVICE | Site: KNEE | Status: NON-FUNCTIONAL
Removed: 2018-06-27

## (undated) DEVICE — SUT MONOCRYL 4.0 PS2 CP496G

## (undated) DEVICE — GUIDEWIRE ORTHO .054 X 6 IN
Type: IMPLANTABLE DEVICE | Site: ULNA | Status: NON-FUNCTIONAL
Removed: 2021-09-17

## (undated) DEVICE — CORD BIPOLAR 12 FOOT

## (undated) DEVICE — SUT VICRYL PLUS 2-0 CT1 18

## (undated) DEVICE — GAUZE DERMACEA LOW PLY 2X4YRDS

## (undated) DEVICE — SPONGE LAP 18X18 PREWASHED

## (undated) DEVICE — PUMP COLD THERAPY

## (undated) DEVICE — BLADE SURG CARBON STEEL SZ11

## (undated) DEVICE — SUT MONOCRYL 4-0 PS-2

## (undated) DEVICE — SUT MONOCRYL 4-0 UD P-3 18

## (undated) DEVICE — SPONGE DERMACEA 4X4IN 12PLY